# Patient Record
Sex: MALE | Race: WHITE | NOT HISPANIC OR LATINO | Employment: OTHER | URBAN - NONMETROPOLITAN AREA
[De-identification: names, ages, dates, MRNs, and addresses within clinical notes are randomized per-mention and may not be internally consistent; named-entity substitution may affect disease eponyms.]

---

## 2017-06-14 ENCOUNTER — OFFICE VISIT - GICH (OUTPATIENT)
Dept: FAMILY MEDICINE | Facility: OTHER | Age: 72
End: 2017-06-14

## 2017-06-14 ENCOUNTER — HISTORY (OUTPATIENT)
Dept: FAMILY MEDICINE | Facility: OTHER | Age: 72
End: 2017-06-14
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
## 2017-06-14 DIAGNOSIS — S30.861A INSECT BITE (NONVENOMOUS) OF ABDOMINAL WALL, INITIAL ENCOUNTER (CODE): ICD-10-CM

## 2017-08-02 ENCOUNTER — HISTORY (OUTPATIENT)
Dept: FAMILY MEDICINE | Facility: OTHER | Age: 72
End: 2017-08-02

## 2017-08-02 ENCOUNTER — OFFICE VISIT - GICH (OUTPATIENT)
Dept: FAMILY MEDICINE | Facility: OTHER | Age: 72
End: 2017-08-02

## 2017-08-02 DIAGNOSIS — N42.9 DISORDER OF PROSTATE: ICD-10-CM

## 2017-08-02 DIAGNOSIS — I10 ESSENTIAL (PRIMARY) HYPERTENSION: ICD-10-CM

## 2017-08-02 DIAGNOSIS — I25.10 ATHEROSCLEROTIC HEART DISEASE OF NATIVE CORONARY ARTERY WITHOUT ANGINA PECTORIS: ICD-10-CM

## 2017-08-02 DIAGNOSIS — E78.5 HYPERLIPIDEMIA: ICD-10-CM

## 2017-08-02 LAB
A/G RATIO - HISTORICAL: 1.4 (ref 1–2)
ABSOLUTE BASOPHILS - HISTORICAL: 0.1 THOU/CU MM
ABSOLUTE EOSINOPHILS - HISTORICAL: 0.7 THOU/CU MM
ABSOLUTE IMMATURE GRANULOCYTES(METAS,MYELOS,PROS) - HISTORICAL: 0 THOU/CU MM
ABSOLUTE LYMPHOCYTES - HISTORICAL: 3.5 THOU/CU MM (ref 0.9–2.9)
ABSOLUTE MONOCYTES - HISTORICAL: 0.8 THOU/CU MM
ABSOLUTE NEUTROPHILS - HISTORICAL: 4.8 THOU/CU MM (ref 1.7–7)
ALBUMIN SERPL-MCNC: 4.3 G/DL (ref 3.5–5.7)
ALP SERPL-CCNC: 67 IU/L (ref 34–104)
ALT (SGPT) - HISTORICAL: 19 IU/L (ref 7–52)
ANION GAP - HISTORICAL: 8 (ref 5–18)
AST SERPL-CCNC: 20 IU/L (ref 13–39)
BASOPHILS # BLD AUTO: 0.7 %
BILIRUB SERPL-MCNC: 0.4 MG/DL (ref 0.3–1)
BUN SERPL-MCNC: 21 MG/DL (ref 7–25)
BUN/CREAT RATIO - HISTORICAL: 26
CALCIUM SERPL-MCNC: 9.7 MG/DL (ref 8.6–10.3)
CHLORIDE SERPLBLD-SCNC: 105 MMOL/L (ref 98–107)
CHOL/HDL RATIO - HISTORICAL: 3.24
CHOLESTEROL TOTAL: 191 MG/DL
CO2 SERPL-SCNC: 26 MMOL/L (ref 21–31)
CREAT SERPL-MCNC: 0.81 MG/DL (ref 0.7–1.3)
EOSINOPHIL NFR BLD AUTO: 6.6 %
ERYTHROCYTE [DISTWIDTH] IN BLOOD BY AUTOMATED COUNT: 13.8 % (ref 11.5–15.5)
GFR IF NOT AFRICAN AMERICAN - HISTORICAL: >60 ML/MIN/1.73M2
GLOBULIN - HISTORICAL: 3.1 G/DL (ref 2–3.7)
GLUCOSE SERPL-MCNC: 99 MG/DL (ref 70–105)
HCT VFR BLD AUTO: 47.3 % (ref 37–53)
HDLC SERPL-MCNC: 59 MG/DL (ref 23–92)
HEMOGLOBIN: 16 G/DL (ref 13.5–17.5)
IMMATURE GRANULOCYTES(METAS,MYELOS,PROS) - HISTORICAL: 0.4 %
LDLC SERPL CALC-MCNC: 84 MG/DL
LYMPHOCYTES NFR BLD AUTO: 35.1 % (ref 20–44)
MCH RBC QN AUTO: 31.1 PG (ref 26–34)
MCHC RBC AUTO-ENTMCNC: 33.8 G/DL (ref 32–36)
MCV RBC AUTO: 92 FL (ref 80–100)
MONOCYTES NFR BLD AUTO: 8.4 %
NEUTROPHILS NFR BLD AUTO: 48.8 % (ref 42–72)
NON-HDL CHOLESTEROL - HISTORICAL: 132 MG/DL
PATIENT STATUS - HISTORICAL: ABNORMAL
PLATELET # BLD AUTO: 294 THOU/CU MM (ref 140–440)
PMV BLD: 9.4 FL (ref 6.5–11)
POTASSIUM SERPL-SCNC: 4.1 MMOL/L (ref 3.5–5.1)
PROT SERPL-MCNC: 7.4 G/DL (ref 6.4–8.9)
PSA TOTAL (DIAGNOSTIC) - HISTORICAL: 1.78 NG/ML
RED BLOOD COUNT - HISTORICAL: 5.14 MIL/CU MM (ref 4.3–5.9)
SODIUM SERPL-SCNC: 139 MMOL/L (ref 133–143)
TRIGL SERPL-MCNC: 240 MG/DL
WHITE BLOOD COUNT - HISTORICAL: 9.9 THOU/CU MM (ref 4.5–11)

## 2017-08-03 ENCOUNTER — AMBULATORY - GICH (OUTPATIENT)
Dept: FAMILY MEDICINE | Facility: OTHER | Age: 72
End: 2017-08-03

## 2017-10-11 ENCOUNTER — AMBULATORY - GICH (OUTPATIENT)
Dept: FAMILY MEDICINE | Facility: OTHER | Age: 72
End: 2017-10-11

## 2017-10-11 DIAGNOSIS — Z23 ENCOUNTER FOR IMMUNIZATION: ICD-10-CM

## 2017-12-28 NOTE — PROGRESS NOTES
Patient Information     Patient Name MRN Sex Doroteo Lang [id number] Male 1945      Progress Notes by Yong Diaz MD at 2017  3:45 PM     Author:  Yong Diaz MD Service:  (none) Author Type:  Physician     Filed:  2017  5:53 PM Encounter Date:  2017 Status:  Signed     :  Yong Diaz MD (Physician)            There are no exam notes on file for this visit.    SUBJECTIVE:  Doroteo Martinez  is a 72 y.o. male who comes in today for complete evaluation. I saw him about a year ago prior to cataract surgery.    He continues on generic atorvastatin. He is doing well and has not had prednisone in 2 years. He is up-to-date on health maintenance issues. He has not had Prevnar. He declines that shot today.     He has built a shed, a deck, another shed, and a path with stairs to garden.  They got back in mid May.  His son and family moved back from [place] to [place] and Geoff did a lot of work in their house.    His wife has joint pain that has been getting worse.         Past Medical, Family, and Social History reviewed and updated as noted below.   ROS is negative except as noted above       Allergies      Allergen   Reactions     Aspirin  Other - Describe In Comment Field     colitis    ,   Family History       Problem   Relation Age of Onset     Other  Mother      Dementia       Other  Father      COPD     ,   Current Outpatient Prescriptions on File Prior to Visit       Medication  Sig Dispense Refill     Blood Pressure Test Kit-Large kit As directed. 1 Kit 0     No current facility-administered medications on file prior to visit.    ,   Past Medical History:     Diagnosis  Date     Hyperlipidemia      Kidney stones 1970s     MI (myocardial infarction) (HC)     Stents      Ulcerative colitis (HC)    ,   Patient Active Problem List      Diagnosis Date Noted     ACP (advance care planning) 12/10/2013     Arthritis of right wrist 2013     ABDOMINAL WALL  "HERNIA 10/13/2011     COLITIS, ULCERATIVE      OTHER AND UNSPECIFIED DISEASES OF APPENDIX      HYPERTENSION      CORONARY ARTERY DISEASE      DIVERTICULOSIS, COLON      HYPERLIPIDEMIA 07/13/2011     FISTULA, INTESTINOVESICAL 04/26/2011   ,   Past Surgical History:      Procedure  Laterality Date     COLECTOMY  4/2011    colon resection with removal of 18 inches of colon and repair of colovesicular fistula Dr. Dawson       COLONOSCOPY SCREENING  6/12    with biopsy, Dr. Barr, [facility]       COLONOSCOPY SCREENING  4/2011     CORONARY STENT PLACEMENT  1997    MI with stenting       SCAN-ANGIOGRAM  1997    coronary angiography with stenting [facility]      and   Social History        Substance Use Topics          Smoking status:   Former Smoker      Quit date:  9/5/1976      Smokeless tobacco:   Never Used      Alcohol use   0.0 oz/week     0 Standard drinks or equivalent per week        Comment: daily wine with dinner 3-4 at times       OBJECTIVE:  /86  Pulse 80  Ht 1.619 m (5' 3.75\")  Wt 63 kg (138 lb 12.8 oz)  BMI 24.01 kg/m2   EXAM:  General Appearance: Pleasant, alert, appropriate appearance for age. No acute distress  Head Exam: Normal. Normocephalic, atraumatic.  Eye Exam:  Normal external eye, conjunctiva, lids, cornea. SHANDRA. EOMI  Ear Exam: Normal TM's bilaterally. Normal auditory canals and external ears. Non-tender.  Nose Exam: Normal external nose, mucus membranes, and septum.  OroPharynx Exam:  Dental hygiene adequate. Normal buccal mucosa. Normal pharynx.  Neck Exam:  Supple, no masses or nodes. No audible bruits  Thyroid Exam: No nodules or enlargement.  Chest/Respiratory Exam: Normal chest wall and respirations. Clear to auscultation.  Cardiovascular Exam: Regular rate and rhythm. S1, S2, no murmur, click, gallop, or rubs.  Gastrointestinal Exam: Soft, non-tender, no masses or organomegaly.  Lymphatic Exam: Non-palpable nodes in neck, clavicular regions.  Musculoskeletal Exam: Back is straight " and non-tender, full ROM of upper and lower extremities.  Foot Exam: Left and right foot: good pedal pulses  Skin: no rash or abnormalities  Neurologic Exam: Nonfocal, normal gross motor, tone coordination and no tremor.  Psychiatric Exam: Alert and oriented - appropriate affect.     Results for orders placed or performed in visit on 08/02/17      COMP METABOLIC PANEL      Result  Value Ref Range    SODIUM 139 133 - 143 mmol/L    POTASSIUM 4.1 3.5 - 5.1 mmol/L    CHLORIDE 105 98 - 107 mmol/L    CO2,TOTAL 26 21 - 31 mmol/L    ANION GAP 8 5 - 18                    GLUCOSE 99 70 - 105 mg/dL    CALCIUM 9.7 8.6 - 10.3 mg/dL    BUN 21 7 - 25 mg/dL    CREATININE 0.81 0.70 - 1.30 mg/dL    BUN/CREAT RATIO           26                    GFR if African American >60 >60 ml/min/1.73m2    GFR if not African American >60 >60 ml/min/1.73m2    ALBUMIN 4.3 3.5 - 5.7 g/dL    PROTEIN,TOTAL 7.4 6.4 - 8.9 g/dL    GLOBULIN                  3.1 2.0 - 3.7 g/dL    A/G RATIO 1.4 1.0 - 2.0                    BILIRUBIN,TOTAL 0.4 0.3 - 1.0 mg/dL    ALK PHOSPHATASE 67 34 - 104 IU/L    ALT (SGPT) 19 7 - 52 IU/L    AST (SGOT) 20 13 - 39 IU/L   LIPID PANEL      Result  Value Ref Range    CHOLESTEROL,TOTAL 191 <200 mg/dL    TRIGLYCERIDES 240 (H) <150 mg/dL    HDL CHOLESTEROL 59 23 - 92 mg/dL    NON-HDL CHOLESTEROL 132 <145 mg/dl    CHOL/HDL RATIO            3.24 <4.50                    LDL CHOLESTEROL 84 <100 mg/dL    PATIENT STATUS            FASTING                   PSA TOTAL (DIAGNOSTIC)      Result  Value Ref Range    PSA TOTAL (DIAGNOSTIC) 1.781 <=3.100 ng/mL   CBC WITH AUTO DIFFERENTIAL      Result  Value Ref Range    WHITE BLOOD COUNT         9.9 4.5 - 11.0 thou/cu mm    RED BLOOD COUNT           5.14 4.30 - 5.90 mil/cu mm    HEMOGLOBIN                16.0 13.5 - 17.5 g/dL    HEMATOCRIT                47.3 37.0 - 53.0 %    MCV                       92 80 - 100 fL    MCH                       31.1 26.0 - 34.0 pg    MCHC                       33.8 32.0 - 36.0 g/dL    RDW                       13.8 11.5 - 15.5 %    PLATELET COUNT            294 140 - 440 thou/cu mm    MPV                       9.4 6.5 - 11.0 fL    NEUTROPHILS               48.8 42.0 - 72.0 %    LYMPHOCYTES               35.1 20.0 - 44.0 %    MONOCYTES                 8.4 <12.0 %    EOSINOPHILS               6.6 <8.0 %    BASOPHILS                 0.7 <3.0 %    IMMATURE GRANULOCYTES(METAS,MYELOS,PROS) 0.4 %    ABSOLUTE NEUTROPHILS      4.8 1.7 - 7.0 thou/cu mm    ABSOLUTE LYMPHOCYTES      3.5 (H) 0.9 - 2.9 thou/cu mm    ABSOLUTE MONOCYTES        0.8 <0.9 thou/cu mm    ABSOLUTE EOSINOPHILS      0.7 (H) <0.5 thou/cu mm    ABSOLUTE BASOPHILS        0.1 <0.3 thou/cu mm    ABSOLUTE IMMATURE GRANULOCYTES(METAS,MYELOS,PROS) 0.0 <=0.3 thou/cu mm      ASSESSMENT/Plan :      Doroteo was seen today for medication management.    Diagnoses and all orders for this visit:    HYPERTENSION  -     CBC AND DIFFERENTIAL; Future  -     COMP METABOLIC PANEL; Future  -     CBC AND DIFFERENTIAL  -     COMP METABOLIC PANEL  -     CBC WITH AUTO DIFFERENTIAL    Hyperlipidemia, unspecified hyperlipidemia type  -     COMP METABOLIC PANEL; Future  -     LIPID PANEL; Future  -     atorvastatin (LIPITOR) 40 mg tablet; Take 1 tablet by mouth once daily.  -     COMP METABOLIC PANEL  -     LIPID PANEL    Atherosclerosis of coronary artery of native heart without angina pectoris, unspecified vessel or lesion type  -     CBC AND DIFFERENTIAL; Future  -     CBC AND DIFFERENTIAL  -     CBC WITH AUTO DIFFERENTIAL    Prostate disorder  -     PSA TOTAL (DIAGNOSTIC); Future  -     PSA TOTAL (DIAGNOSTIC)      Will notify of lab results when available. Discussed diet, exercise and healthy lifestyle changes. Continue current medications. Offered Prevnar and he declines.     A total of 25 minutes was spent with the patient, greater than 50% of the time was spent in counseling/discussion of the aforementioned concerns.       Yong VAZQUEZ  MD Joe

## 2017-12-28 NOTE — PROGRESS NOTES
Patient Information     Patient Name MRN Sex Doroteo Chow [id number] Male 1945      Progress Notes by Chetna Espinoza NP at 2017 12:15 PM     Author:  Chetna Espinoza NP Service:  (none) Author Type:  PHYS- Nurse Practitioner     Filed:  2017  2:58 PM Encounter Date:  2017 Status:  Signed     :  Chetna Espinoza NP (PHYS- Nurse Practitioner)            HPI:    Doroteo Martinez is a 72 y.o. male who presents to clinic today for tick bite.    Found an attached tick on his back yesterday.   He thinks it was a deer tick but he is unsure.  States tick was not engorged or swollen.  Unsure how long the tick was attached.  Area does not itch and is not painful.  Washed area well and covering with bandaid.   Denies any fevers or general illness.              Past Medical History:     Diagnosis  Date     Hyperlipidemia      Kidney stones      MI (myocardial infarction) (HC)     Stents      Ulcerative colitis (HC)      Past Surgical History:      Procedure  Laterality Date     COLECTOMY  2011    colon resection with removal of 18 inches of colon and repair of colovesicular fistula Dr. Dawson       [facility]       [facility]ENING  2011     CORONARY STENT PLACEMENT      MI with stenting       SCAN-ANGIOGRAM      coronary angiography with stenting [facility]       Social History        Substance Use Topics          Smoking status:   Former Smoker      Quit date:  1976      Smokeless tobacco:   Never Used      Alcohol use   0.0 oz/week     0 Standard drinks or equivalent per week        Comment: daily wine with dinner 3-4 at times       Current Outpatient Prescriptions       Medication  Sig Dispense Refill     atorvastatin (LIPITOR) 40 mg tablet TAKE ONE TABLET BY MOUTH ONE TIME DAILY 90 tablet 3     Blood Pressure Test Kit-Large kit As directed. 1 Kit 0     No current facility-administered  "medications for this visit.      Medications have been reviewed by me and are current to the best of my knowledge and ability.    Allergies      Allergen   Reactions     Aspirin  Other - Describe In Comment Field     colitis        Past medical history, past surgical history, current medications and allergies reviewed and accurate to the best of my knowledge.        ROS:  Refer to HPI    /72  Pulse 68  Temp 97  F (36.1  C) (Tympanic)   Resp 20  Ht 1.651 m (5' 5\")  Wt 65 kg (143 lb 3.2 oz)  BMI 23.83 kg/m2    EXAM:  General Appearance: Well appearing elderly male, appropriate appearance for age. No acute distress  Respiratory:   Normal effort.    Musculoskeletal:  Normal gait.  Equal movement of bilateral upper extremities.  Equal movement of bilateral lower extremities.    Dermatological: Right flank/mid lateral back with single tick bite - no scab, no erythema or excoriation, no swelling, no bruising  Psychological: normal affect, alert and pleasant          ASSESSMENT/PLAN:    ICD-10-CM    1. Tick bite of right flank, initial encounter S30.861A      W57.XXXA          Discussed at length tick education and treatment options  Patient was given the option of prophylactic treatment vs watch & wait  Patient states after tick education he chooses the watch & wait approach  No lab work needed today as it is too soon for accurate results   Wash area daily and apply antibiotic ointment as needed   Discussed warning signs/symptoms   Follow up if symptoms occur (increased redness, fevers, aches, chills, etc) or any concerns         Patient Instructions      Index Romanian Related topics   Tick Bite   ________________________________________________________________________  KEY POINTS    Ticks are small bugs that feed on the blood of animals, birds, and people. If you find a tick attached to your skin, you have been bitten. You usually will not feel anything when a tick bites you, but you may have a little redness " around the bite.    You need to remove the tick yourself or get help from your healthcare provider. Save the tick in case you later start having symptoms of disease and need to know what kind of tick bit you. Check for a rash and other symptoms for about 4 weeks after the bite.    When you are outdoors, wear long-sleeved shirts tucked into your pants. Wear your pants tucked into your socks or boot tops if possible. Use approved tick repellents on exposed skin and clothing.  ________________________________________________________________________  What are ticks?  Ticks are small bugs that feed on the blood of animals, birds, and people. There are many different kinds of ticks. Black-legged ticks, or deer ticks, are usually no bigger than the head of a pin. Wood and dog ticks are usually much larger. They may be about a quarter of an inch before feeding and half an inch when they are full of blood.  Ticks are found in woodlands, grasslands, and marshlands and at the seashore. Wild birds and animals, as well as domestic animals and pets such as dogs, horses, and cows, can carry ticks. Ticks may climb on humans from animals, leaves, or low-lying brush. They may fall from tree leaves, especially on a windy day. Ticks cannot jump or fly.  How do I know if I have been bitten by a tick?  If you find a tick attached to your skin, you have been bitten. You usually will not feel anything when a tick bites you, but you may have a little redness around the bite.  Can I get sick from a tick bite?  There is little risk from the bite of a tick most of the time. However, some ticks carry infections that can be passed to people. For example:    Deer tick bites may cause Lyme disease. The symptoms of Lyme disease include a red, round rash that starts at the site of the bite and gets bigger. Over time, a similar rash may appear in other parts of the body. In some cases, the rash looks like a bulls-eye or target on your skin. Some  people have a rash without other symptoms. If you do have other symptoms, they may include feeling very tired, headache, muscle aches, joint pain or swelling, and a fever.    Wood tick or dog tick bites may cause [place] spotted fever (RMSF). RMSF may first cause fever, headache, muscle aches, joint pain or swelling, and then a pink or red spotted rash. You need to get treatment right away if you have these symptoms and have had a tick bite. RMSF is seen in most states, not just the [place] areas.  Tick bites may cause other diseases as well.  How are tick bites treated?  If you find a tick attached to your body, you need to remove it. You can remove it yourself or get help from your healthcare provider. To remove an attached tick:    Grasp the tick with tweezers or fingers (covered with gloves or a tissue) as close to the skin as possible.    Gently pull the tick straight away from you until it releases its hold. Use a slow gentle pulling motion. Pulling the tick out too quickly may tear the body from the mouth, leaving the mouth still in the skin. If you are unable to remove the tick completely, you may need to see your healthcare provider.    Do not twist the tick as you pull, and try not to squeeze its body. Squeezing or crushing the tick could force infected fluids from the tick into the site of the bite.  After you have removed the tick, thoroughly wash your hands and the bite area with soap and water. Put an antiseptic such as rubbing alcohol on the area where you were bitten.  Infected ticks usually do not spread an infection until after the tick has been attached and feeding on your blood for several hours. Check for a rash and other symptoms for about 4 weeks after the bite.  Save the tick in case you later start having symptoms of disease and need to know what kind of tick bit you. Put the tick in a sealed plastic bag and keep it in the freezer. Identification of the tick may help your  provider diagnose and treat your symptoms. If you do not have any symptoms of disease after 1 month, you can throw the tick away.  How can I help prevent tick bites?    In areas of thick underbrush, try to stay near the center of trails.    When you are outdoors, wear long-sleeved shirts tucked into your pants. Wear your pants tucked into your socks or boot tops if possible. A hat may help, too. Wearing light-colored clothing may make it easier to spot a small tick before it reaches your skin and bites. While you are outside, check for ticks every 4 hours and remove any ticks on clothing or exposed skin.    Use approved tick repellents on exposed skin and clothing. Don't use more repellent than recommended in the package directions. Don't put repellent on open wounds or rashes. When using sprays, don t spray the repellent directly on your face. Spray the repellent on your hands first and then put it on your face, but not near your eyes or mouth. Then wash the spray off your hands.    Adults should use products with no more than 35% DEET. Children older than 2 months can use repellents with no more than 30% DEET. DEET should be applied just once a day. Wash it off your body when you go back indoors. Some products contain more than 35% DEET. The higher concentrations are no more effective than the lower concentrations, but they may last longer. Read the label carefully before applying.    Picaridin may irritate the skin less than DEET and appears to be just as effective.    Spray clothes with repellents because insects may crawl from clothing to the skin or bite through thin clothing. Products containing permethrin are recommended for use on clothing, shoes, bed nets, and camping gear. Permethrin-treated clothing repels and kills ticks, mosquitoes, and other insects and can keep working after laundering. Permethrin should be reapplied to clothing according to the instructions on the product label. You can buy clothing  and hats pretreated with permethrin. Permethrin does not work as a repellent when it is put on the skin.    Treat household pets for ticks and fleas. Check pets after they have been outdoors.    Brush off clothing and pets before entering the house.    After you have been outdoors, undress and check your body for ticks. They usually crawl around for several hours before biting. Check your clothes, too. Wash them right away to remove any ticks.    Shower and shampoo after your outing.    Inspect any gear you have carried outdoors.    If you spend much time hiking, you may want to include a pair of tick tweezers in your first-aid kit. You can buy them at sporting goods stores.  Developed by [employer].  Adult Advisor 2016.3 published by [employer].  Last modified: 2016-04-04  Last reviewed: 2016-03-31  This content is reviewed periodically and is subject to change as new health information becomes available. The information is intended to inform and educate and is not a replacement for medical evaluation, advice, diagnosis or treatment by a healthcare professional.  References   Adult Advisor 2016.3 Index    Copyright   2016 [employer], a division of [employer] All rights reserved.

## 2017-12-29 NOTE — PATIENT INSTRUCTIONS
Patient Information     Patient Name MRN Doroteo Shankar [id number] Male 1945      Patient Instructions by Chetna Espinoza NP at 2017 12:56 PM     Author:  Chetna Espinoza NP  Service:  (none) Author Type:  PHYS- Nurse Practitioner     Filed:  2017 12:57 PM  Encounter Date:  2017 Status:  Addendum     :  Chetna Espinoza NP (PHYS- Nurse Practitioner)        Related Notes: Original Note by Chetna Espinoza NP (PHYS- Nurse Practitioner) filed at 2017 12:56 PM               Index Japanese Related topics   Tick Bite   ________________________________________________________________________  KEY POINTS    Ticks are small bugs that feed on the blood of animals, birds, and people. If you find a tick attached to your skin, you have been bitten. You usually will not feel anything when a tick bites you, but you may have a little redness around the bite.    You need to remove the tick yourself or get help from your healthcare provider. Save the tick in case you later start having symptoms of disease and need to know what kind of tick bit you. Check for a rash and other symptoms for about 4 weeks after the bite.    When you are outdoors, wear long-sleeved shirts tucked into your pants. Wear your pants tucked into your socks or boot tops if possible. Use approved tick repellents on exposed skin and clothing.  ________________________________________________________________________  What are ticks?  Ticks are small bugs that feed on the blood of animals, birds, and people. There are many different kinds of ticks. Black-legged ticks, or deer ticks, are usually no bigger than the head of a pin. Wood and dog ticks are usually much larger. They may be about a quarter of an inch before feeding and half an inch when they are full of blood.  Ticks are found in woodlands, grasslands, and marshlands and at the seashore. Wild birds and animals, as well as domestic animals and pets such  as dogs, horses, and cows, can carry ticks. Ticks may climb on humans from animals, leaves, or low-lying brush. They may fall from tree leaves, especially on a windy day. Ticks cannot jump or fly.  How do I know if I have been bitten by a tick?  If you find a tick attached to your skin, you have been bitten. You usually will not feel anything when a tick bites you, but you may have a little redness around the bite.  Can I get sick from a tick bite?  There is little risk from the bite of a tick most of the time. However, some ticks carry infections that can be passed to people. For example:    Deer tick bites may cause Lyme disease. The symptoms of Lyme disease include a red, round rash that starts at the site of the bite and gets bigger. Over time, a similar rash may appear in other parts of the body. In some cases, the rash looks like a bulls-eye or target on your skin. Some people have a rash without other symptoms. If you do have other symptoms, they may include feeling very tired, headache, muscle aches, joint pain or swelling, and a fever.    Wood tick or dog tick bites may cause [place] spotted fever (RMSF). RMSF may first cause fever, headache, muscle aches, joint pain or swelling, and then a pink or red spotted rash. You need to get treatment right away if you have these symptoms and have had a tick bite. RMSF is seen in most states, not just the [place] areas.  Tick bites may cause other diseases as well.  How are tick bites treated?  If you find a tick attached to your body, you need to remove it. You can remove it yourself or get help from your healthcare provider. To remove an attached tick:    Grasp the tick with tweezers or fingers (covered with gloves or a tissue) as close to the skin as possible.    Gently pull the tick straight away from you until it releases its hold. Use a slow gentle pulling motion. Pulling the tick out too quickly may tear the body from the mouth, leaving the mouth  still in the skin. If you are unable to remove the tick completely, you may need to see your healthcare provider.    Do not twist the tick as you pull, and try not to squeeze its body. Squeezing or crushing the tick could force infected fluids from the tick into the site of the bite.  After you have removed the tick, thoroughly wash your hands and the bite area with soap and water. Put an antiseptic such as rubbing alcohol on the area where you were bitten.  Infected ticks usually do not spread an infection until after the tick has been attached and feeding on your blood for several hours. Check for a rash and other symptoms for about 4 weeks after the bite.  Save the tick in case you later start having symptoms of disease and need to know what kind of tick bit you. Put the tick in a sealed plastic bag and keep it in the freezer. Identification of the tick may help your provider diagnose and treat your symptoms. If you do not have any symptoms of disease after 1 month, you can throw the tick away.  How can I help prevent tick bites?    In areas of thick underbrush, try to stay near the center of trails.    When you are outdoors, wear long-sleeved shirts tucked into your pants. Wear your pants tucked into your socks or boot tops if possible. A hat may help, too. Wearing light-colored clothing may make it easier to spot a small tick before it reaches your skin and bites. While you are outside, check for ticks every 4 hours and remove any ticks on clothing or exposed skin.    Use approved tick repellents on exposed skin and clothing. Don't use more repellent than recommended in the package directions. Don't put repellent on open wounds or rashes. When using sprays, don t spray the repellent directly on your face. Spray the repellent on your hands first and then put it on your face, but not near your eyes or mouth. Then wash the spray off your hands.    Adults should use products with no more than 35% DEET. Children  older than 2 months can use repellents with no more than 30% DEET. DEET should be applied just once a day. Wash it off your body when you go back indoors. Some products contain more than 35% DEET. The higher concentrations are no more effective than the lower concentrations, but they may last longer. Read the label carefully before applying.    Picaridin may irritate the skin less than DEET and appears to be just as effective.    Spray clothes with repellents because insects may crawl from clothing to the skin or bite through thin clothing. Products containing permethrin are recommended for use on clothing, shoes, bed nets, and camping gear. Permethrin-treated clothing repels and kills ticks, mosquitoes, and other insects and can keep working after laundering. Permethrin should be reapplied to clothing according to the instructions on the product label. You can buy clothing and hats pretreated with permethrin. Permethrin does not work as a repellent when it is put on the skin.    Treat household pets for ticks and fleas. Check pets after they have been outdoors.    Brush off clothing and pets before entering the house.    After you have been outdoors, undress and check your body for ticks. They usually crawl around for several hours before biting. Check your clothes, too. Wash them right away to remove any ticks.    Shower and shampoo after your outing.    Inspect any gear you have carried outdoors.    If you spend much time hiking, you may want to include a pair of tick tweezers in your first-aid kit. You can buy them at sporting goods stores.  Developed by [employer].  Adult Advisor 2016.3 published by [employer].  Last modified: 2016-04-04  Last reviewed: 2016-03-31  This content is reviewed periodically and is subject to change as new health information becomes available. The information is intended to inform and educate and is not a replacement for medical evaluation, advice, diagnosis or treatment by a  healthcare professional.  References   Adult Advisor 2016.3 Index    Copyright   2016 [employer], a division of [employer] All rights reserved.

## 2017-12-30 NOTE — NURSING NOTE
Patient Information     Patient Name MRN Sex Doroteo Lang [id number] Male 1945      Nursing Note by Meg Guevara at 2017 12:15 PM     Author:  Meg Guevara Service:  (none) Author Type:  (none)     Filed:  2017 12:46 PM Encounter Date:  2017 Status:  Signed     :  Meg Guevara            Patient presents to clinic with tick bite on back. He states that the tick was dead before wife removed it.  Meg GuevaraLPN ....................  2017   12:12 PM

## 2018-01-26 VITALS
BODY MASS INDEX: 23.7 KG/M2 | SYSTOLIC BLOOD PRESSURE: 146 MMHG | DIASTOLIC BLOOD PRESSURE: 86 MMHG | HEART RATE: 80 BPM | HEIGHT: 64 IN | WEIGHT: 138.8 LBS

## 2018-01-26 VITALS
SYSTOLIC BLOOD PRESSURE: 128 MMHG | HEART RATE: 68 BPM | RESPIRATION RATE: 20 BRPM | WEIGHT: 143.2 LBS | HEIGHT: 65 IN | TEMPERATURE: 97 F | BODY MASS INDEX: 23.86 KG/M2 | DIASTOLIC BLOOD PRESSURE: 72 MMHG

## 2018-03-08 ENCOUNTER — DOCUMENTATION ONLY (OUTPATIENT)
Dept: FAMILY MEDICINE | Facility: OTHER | Age: 73
End: 2018-03-08

## 2018-03-08 PROBLEM — I25.10 CORONARY ATHEROSCLEROSIS: Status: ACTIVE | Noted: 2018-03-08

## 2018-03-08 PROBLEM — K38.9 OTHER AND UNSPECIFIED DISEASES OF APPENDIX: Status: ACTIVE | Noted: 2018-03-08

## 2018-03-08 PROBLEM — I10 HYPERTENSION: Status: ACTIVE | Noted: 2018-03-08

## 2018-03-08 PROBLEM — K57.30 DIVERTICULAR DISEASE OF COLON: Status: ACTIVE | Noted: 2018-03-08

## 2018-03-08 PROBLEM — K51.90 COLITIS, ULCERATIVE (H): Status: ACTIVE | Noted: 2018-03-08

## 2018-03-08 RX ORDER — ATORVASTATIN CALCIUM 40 MG/1
40 TABLET, FILM COATED ORAL DAILY
COMMUNITY
Start: 2017-08-02 | End: 2018-09-13

## 2018-03-08 RX ORDER — BENZOCAINE/MENTHOL 6 MG-10 MG
LOZENGE MUCOUS MEMBRANE
COMMUNITY
Start: 2016-05-10

## 2018-09-13 ENCOUNTER — OFFICE VISIT (OUTPATIENT)
Dept: FAMILY MEDICINE | Facility: OTHER | Age: 73
End: 2018-09-13
Attending: FAMILY MEDICINE
Payer: COMMERCIAL

## 2018-09-13 VITALS
DIASTOLIC BLOOD PRESSURE: 94 MMHG | SYSTOLIC BLOOD PRESSURE: 146 MMHG | TEMPERATURE: 97.2 F | BODY MASS INDEX: 23.13 KG/M2 | HEART RATE: 72 BPM | WEIGHT: 138.8 LBS | HEIGHT: 65 IN

## 2018-09-13 DIAGNOSIS — I10 ESSENTIAL HYPERTENSION: ICD-10-CM

## 2018-09-13 DIAGNOSIS — K51.90 ULCERATIVE COLITIS WITHOUT COMPLICATIONS, UNSPECIFIED LOCATION (H): ICD-10-CM

## 2018-09-13 DIAGNOSIS — M19.031 PRIMARY OSTEOARTHRITIS OF BOTH WRISTS: ICD-10-CM

## 2018-09-13 DIAGNOSIS — I25.10 ATHEROSCLEROSIS OF CORONARY ARTERY OF NATIVE HEART WITHOUT ANGINA PECTORIS, UNSPECIFIED VESSEL OR LESION TYPE: ICD-10-CM

## 2018-09-13 DIAGNOSIS — M19.032 PRIMARY OSTEOARTHRITIS OF BOTH WRISTS: ICD-10-CM

## 2018-09-13 DIAGNOSIS — E78.5 HYPERLIPIDEMIA, UNSPECIFIED HYPERLIPIDEMIA TYPE: Primary | ICD-10-CM

## 2018-09-13 DIAGNOSIS — N42.9 PROSTATE DISORDER: ICD-10-CM

## 2018-09-13 LAB
ALBUMIN SERPL-MCNC: 4.4 G/DL (ref 3.5–5.7)
ALP SERPL-CCNC: 63 U/L (ref 34–104)
ALT SERPL W P-5'-P-CCNC: 16 U/L (ref 7–52)
ANION GAP SERPL CALCULATED.3IONS-SCNC: 7 MMOL/L (ref 3–14)
AST SERPL W P-5'-P-CCNC: 20 U/L (ref 13–39)
BASOPHILS # BLD AUTO: 0.1 10E9/L (ref 0–0.2)
BASOPHILS NFR BLD AUTO: 0.8 %
BILIRUB SERPL-MCNC: 0.8 MG/DL (ref 0.3–1)
BUN SERPL-MCNC: 22 MG/DL (ref 7–25)
CALCIUM SERPL-MCNC: 9.5 MG/DL (ref 8.6–10.3)
CHLORIDE SERPL-SCNC: 102 MMOL/L (ref 98–107)
CHOLEST SERPL-MCNC: 193 MG/DL
CO2 SERPL-SCNC: 28 MMOL/L (ref 21–31)
CREAT SERPL-MCNC: 0.87 MG/DL (ref 0.7–1.3)
DIFFERENTIAL METHOD BLD: NORMAL
EOSINOPHIL # BLD AUTO: 0.3 10E9/L (ref 0–0.7)
EOSINOPHIL NFR BLD AUTO: 3.4 %
ERYTHROCYTE [DISTWIDTH] IN BLOOD BY AUTOMATED COUNT: 13.7 % (ref 10–15)
GFR SERPL CREATININE-BSD FRML MDRD: 86 ML/MIN/1.7M2
GLUCOSE SERPL-MCNC: 99 MG/DL (ref 70–105)
HCT VFR BLD AUTO: 46.4 % (ref 40–53)
HDLC SERPL-MCNC: 62 MG/DL (ref 23–92)
HGB BLD-MCNC: 15.2 G/DL (ref 13.3–17.7)
IMM GRANULOCYTES # BLD: 0 10E9/L (ref 0–0.4)
IMM GRANULOCYTES NFR BLD: 0.5 %
LDLC SERPL CALC-MCNC: 111 MG/DL
LYMPHOCYTES # BLD AUTO: 2.5 10E9/L (ref 0.8–5.3)
LYMPHOCYTES NFR BLD AUTO: 31.9 %
MCH RBC QN AUTO: 29.6 PG (ref 26.5–33)
MCHC RBC AUTO-ENTMCNC: 32.8 G/DL (ref 31.5–36.5)
MCV RBC AUTO: 90 FL (ref 78–100)
MONOCYTES # BLD AUTO: 0.6 10E9/L (ref 0–1.3)
MONOCYTES NFR BLD AUTO: 7.7 %
NEUTROPHILS # BLD AUTO: 4.4 10E9/L (ref 1.6–8.3)
NEUTROPHILS NFR BLD AUTO: 55.7 %
NONHDLC SERPL-MCNC: 131 MG/DL
PLATELET # BLD AUTO: 305 10E9/L (ref 150–450)
POTASSIUM SERPL-SCNC: 4.1 MMOL/L (ref 3.5–5.1)
PROT SERPL-MCNC: 7.4 G/DL (ref 6.4–8.9)
PSA SERPL-MCNC: 2.21 NG/ML
RBC # BLD AUTO: 5.13 10E12/L (ref 4.4–5.9)
SODIUM SERPL-SCNC: 137 MMOL/L (ref 134–144)
TRIGL SERPL-MCNC: 102 MG/DL
WBC # BLD AUTO: 7.8 10E9/L (ref 4–11)

## 2018-09-13 PROCEDURE — 36415 COLL VENOUS BLD VENIPUNCTURE: CPT | Performed by: FAMILY MEDICINE

## 2018-09-13 PROCEDURE — 84153 ASSAY OF PSA TOTAL: CPT | Performed by: FAMILY MEDICINE

## 2018-09-13 PROCEDURE — 80053 COMPREHEN METABOLIC PANEL: CPT | Performed by: FAMILY MEDICINE

## 2018-09-13 PROCEDURE — 80061 LIPID PANEL: CPT | Performed by: FAMILY MEDICINE

## 2018-09-13 PROCEDURE — 85025 COMPLETE CBC W/AUTO DIFF WBC: CPT | Performed by: FAMILY MEDICINE

## 2018-09-13 PROCEDURE — G0463 HOSPITAL OUTPT CLINIC VISIT: HCPCS

## 2018-09-13 PROCEDURE — 99214 OFFICE O/P EST MOD 30 MIN: CPT | Performed by: FAMILY MEDICINE

## 2018-09-13 RX ORDER — ATORVASTATIN CALCIUM 40 MG/1
40 TABLET, FILM COATED ORAL DAILY
Qty: 90 TABLET | Refills: 4 | Status: SHIPPED | OUTPATIENT
Start: 2018-09-13 | End: 2019-07-05

## 2018-09-13 NOTE — PROGRESS NOTES
Nursing Notes:   Meg Guevara LPN  9/13/2018 10:35 AM  Signed  Patient presents to clinic for medication check.  Meg Stapleton ....................  9/13/2018   10:35 AM        SUBJECTIVE:  Doroteo Martinez  is a 73 year old male who comes in today for follow-up and medication management.  He is on Lipitor for hyperlipidemia. I last saw him a year ago.  Labs at that time were normal.  He has a history of hypertension but is currently not on blood pressure medication.  He has a history of ulcerative colitis but that has been quiet.  He follows with gastroenterology but has not been seen for a few years.    Their daughter had another baby in January in [place], so they came back to help. [place] just moved to [place] going to [place] in late October. The cabin is not winter ready.     He has some wrist pain worse with labor. His wife went to an acupuncturist and had good results. He has questions about alternative medicine approaches.     Past Medical, Family, and Social History reviewed and updated as noted below.   ROS is negative except as noted above       Allergies   Allergen Reactions     Aspirin Other (See Comments)     colitis   ,   Family History   Problem Relation Age of Onset     Other - See Comments Mother      Dementia     Other - See Comments Father      COPD   ,   Current Outpatient Prescriptions   Medication     atorvastatin (LIPITOR) 40 MG tablet     Blood Pressure Monitor MISC     diclofenac (VOLTAREN) 1 % GEL topical gel     [DISCONTINUED] atorvastatin (LIPITOR) 40 MG tablet     No current facility-administered medications for this visit.    ,   Past Medical History:   Diagnosis Date     Calculus of kidney     1970s     Hyperlipidemia     No Comments Provided     ST elevation (STEMI) myocardial infarction (H)     1997,Stents     Ulcerative colitis without complications (H)     No Comments Provided   ,   Patient Active  "Problem List    Diagnosis Date Noted     Colitis, ulcerative (H) 03/08/2018     Priority: Medium     Coronary atherosclerosis 03/08/2018     Priority: Medium     Diverticular disease of colon 03/08/2018     Priority: Medium     Hypertension 03/08/2018     Priority: Medium     Other and unspecified diseases of appendix 03/08/2018     Priority: Medium     ACP (advance care planning) 12/10/2013     Priority: Medium     Arthritis of right wrist 09/11/2013     Priority: Medium     Abdominal wall hernia 10/13/2011     Priority: Medium     Hyperlipidemia 07/13/2011     Priority: Medium     Fistula, intestinovesical 04/26/2011     Priority: Medium   ,   Past Surgical History:   Procedure Laterality Date     COLON SURGERY      4/2011,colon resection with removal of 18 inches of colon and repair of colovesicular fistula Dr. Dawson     COLONOSCOPY      6/12,with biopsy, Dr. Barr, [facility]     COLONOSCOPY      4/2011     HEART CATH, ANGIOPLASTY      1997,MI with stenting     OTHER SURGICAL HISTORY      1997,207497,SCAN-ANGIOGRAM,coronary angiography with stenting [facility]    and   Social History   Substance Use Topics     Smoking status: Former Smoker     Quit date: 9/5/1976     Smokeless tobacco: Never Used     Alcohol use 0.0 oz/week      Comment: Alcoholic Drinks/day: daily wine with dinner 3-4 at times     OBJECTIVE:  BP (!) 146/94  Pulse 72  Temp 97.2  F (36.2  C)  Ht 5' 5\" (1.651 m)  Wt 138 lb 12.8 oz (63 kg)  BMI 23.1 kg/m2   EXAM:  General Appearance: Pleasant, alert, appropriate appearance for age. No acute distress  Head Exam: Normal. Normocephalic, atraumatic.  Eye Exam:  Normal external eye, conjunctiva, lids, cornea. SHANDRA. EOMI  Ear Exam: Normal TM's bilaterally. Normal auditory canals and external ears. Non-tender.  Nose Exam: Normal external nose, mucus membranes, and septum.  OroPharynx Exam:  Dental hygiene adequate. Normal buccal mucosa. Normal pharynx.  Neck Exam:  Supple, no masses or nodes. No audible " bruits  Thyroid Exam: No nodules or enlargement.  Chest/Respiratory Exam: Normal chest wall and respirations. Clear to auscultation.  Cardiovascular Exam: Regular rate and rhythm. S1, S2, no murmur, click, gallop, or rubs.  Gastrointestinal Exam: Soft, non-tender, no masses or organomegaly.  Lymphatic Exam: Non-palpable nodes in neck, clavicular regions.  Musculoskeletal Exam: Back is straight and non-tender, full ROM of upper and lower extremities.  Osteoarthritic changes in both wrists.  Foot Exam: Left and right foot: good pedal pulses  Skin: no rash or abnormalities  Neurologic Exam: Nonfocal, normal gross motor, tone coordination and no tremor.  Psychiatric Exam: Alert and oriented - appropriate affect.     Results for orders placed or performed in visit on 09/13/18   CBC with platelets differential   Result Value Ref Range    WBC 7.8 4.0 - 11.0 10e9/L    RBC Count 5.13 4.4 - 5.9 10e12/L    Hemoglobin 15.2 13.3 - 17.7 g/dL    Hematocrit 46.4 40.0 - 53.0 %    MCV 90 78 - 100 fl    MCH 29.6 26.5 - 33.0 pg    MCHC 32.8 31.5 - 36.5 g/dL    RDW 13.7 10.0 - 15.0 %    Platelet Count 305 150 - 450 10e9/L    Diff Method Automated Method     % Neutrophils 55.7 %    % Lymphocytes 31.9 %    % Monocytes 7.7 %    % Eosinophils 3.4 %    % Basophils 0.8 %    % Immature Granulocytes 0.5 %    Absolute Neutrophil 4.4 1.6 - 8.3 10e9/L    Absolute Lymphocytes 2.5 0.8 - 5.3 10e9/L    Absolute Monocytes 0.6 0.0 - 1.3 10e9/L    Absolute Eosinophils 0.3 0.0 - 0.7 10e9/L    Absolute Basophils 0.1 0.0 - 0.2 10e9/L    Abs Immature Granulocytes 0.0 0 - 0.4 10e9/L   Comprehensive metabolic panel   Result Value Ref Range    Sodium 137 134 - 144 mmol/L    Potassium 4.1 3.5 - 5.1 mmol/L    Chloride 102 98 - 107 mmol/L    Carbon Dioxide 28 21 - 31 mmol/L    Anion Gap 7 3 - 14 mmol/L    Glucose 99 70 - 105 mg/dL    Urea Nitrogen 22 7 - 25 mg/dL    Creatinine 0.87 0.70 - 1.30 mg/dL    GFR Estimate 86 >60 mL/min/1.7m2    GFR Estimate If Black >90  >60 mL/min/1.7m2    Calcium 9.5 8.6 - 10.3 mg/dL    Bilirubin Total 0.8 0.3 - 1.0 mg/dL    Albumin 4.4 3.5 - 5.7 g/dL    Protein Total 7.4 6.4 - 8.9 g/dL    Alkaline Phosphatase 63 34 - 104 U/L    ALT 16 7 - 52 U/L    AST 20 13 - 39 U/L   Prostate Specific Antigen GH   Result Value Ref Range    Prostate Specific Antigen 2.206 <3.100 ng/mL   Lipid Profile   Result Value Ref Range    Cholesterol 193 <200 mg/dL    Triglycerides 102 <150 mg/dL    HDL Cholesterol 62 23 - 92 mg/dL    LDL Cholesterol Calculated 111 (H) <100 mg/dL    Non HDL Cholesterol 131 (H) <130 mg/dL      ASSESSMENT/Plan :    Doroteo was seen today for medication therapy management.    Diagnoses and all orders for this visit:    Hyperlipidemia, unspecified hyperlipidemia type  -     atorvastatin (LIPITOR) 40 MG tablet; Take 1 tablet (40 mg) by mouth daily  -     Lipid Profile; Future  -     Lipid Profile    Atherosclerosis of coronary artery of native heart without angina pectoris, unspecified vessel or lesion type  -     Lipid Profile; Future  -     Lipid Profile    Essential hypertension  -     Comprehensive metabolic panel; Future  -     Comprehensive metabolic panel    Prostate disorder  -     Prostate Specific Antigen GH; Future  -     Prostate Specific Antigen GH    Ulcerative colitis without complications, unspecified location (H)  -     CBC with platelets differential; Future  -     CBC with platelets differential    Primary osteoarthritis of both wrists  -     diclofenac (VOLTAREN) 1 % GEL topical gel; Apply 4 grams to knees or 2 grams to hands four times daily using enclosed dosing card.      Will notify of lab results when available. Discussed diet, exercise and healthy lifestyle changes. Continue current medications. Trial of Voltaren gel for arthritis.  Discussed Shingrix.     A total of 25 minutes was spent with the patient, greater than 50% of the time was spent in counseling/discussion of the aforementioned concerns.     Yong Diaz,  MD

## 2018-09-13 NOTE — LETTER
September 13, 2018      Doroteo Martinez  [address]  [place] [address]        Dear Geoff,     Your lab tests look fine. Your complete blood count was normal. Your comprehensive metabolic panel (a test that looks at liver and kidney function, blood sugar, electrolytes, and nutritional status) was normal. Your cholesterol is fine. Your prostate test is normal.    It was a pleasure seeing you the other day.  If you have any questions, please don't hesitate to call us.       Sincerely,        Yong Diaz MD                                        Results for orders placed or performed in visit on 09/13/18   CBC with platelets differential   Result Value Ref Range    WBC 7.8 4.0 - 11.0 10e9/L    RBC Count 5.13 4.4 - 5.9 10e12/L    Hemoglobin 15.2 13.3 - 17.7 g/dL    Hematocrit 46.4 40.0 - 53.0 %    MCV 90 78 - 100 fl    MCH 29.6 26.5 - 33.0 pg    MCHC 32.8 31.5 - 36.5 g/dL    RDW 13.7 10.0 - 15.0 %    Platelet Count 305 150 - 450 10e9/L    Diff Method Automated Method     % Neutrophils 55.7 %    % Lymphocytes 31.9 %    % Monocytes 7.7 %    % Eosinophils 3.4 %    % Basophils 0.8 %    % Immature Granulocytes 0.5 %    Absolute Neutrophil 4.4 1.6 - 8.3 10e9/L    Absolute Lymphocytes 2.5 0.8 - 5.3 10e9/L    Absolute Monocytes 0.6 0.0 - 1.3 10e9/L    Absolute Eosinophils 0.3 0.0 - 0.7 10e9/L    Absolute Basophils 0.1 0.0 - 0.2 10e9/L    Abs Immature Granulocytes 0.0 0 - 0.4 10e9/L   Comprehensive metabolic panel   Result Value Ref Range    Sodium 137 134 - 144 mmol/L    Potassium 4.1 3.5 - 5.1 mmol/L    Chloride 102 98 - 107 mmol/L    Carbon Dioxide 28 21 - 31 mmol/L    Anion Gap 7 3 - 14 mmol/L    Glucose 99 70 - 105 mg/dL    Urea Nitrogen 22 7 - 25 mg/dL    Creatinine 0.87 0.70 - 1.30 mg/dL    GFR Estimate 86 >60 mL/min/1.7m2    GFR Estimate If Black >90 >60 mL/min/1.7m2    Calcium 9.5 8.6 - 10.3 mg/dL    Bilirubin Total 0.8 0.3 - 1.0 mg/dL    Albumin 4.4 3.5 - 5.7 g/dL    Protein Total 7.4 6.4 - 8.9 g/dL     Alkaline Phosphatase 63 34 - 104 U/L    ALT 16 7 - 52 U/L    AST 20 13 - 39 U/L   Prostate Specific Antigen GH   Result Value Ref Range    Prostate Specific Antigen 2.206 <3.100 ng/mL   Lipid Profile   Result Value Ref Range    Cholesterol 193 <200 mg/dL    Triglycerides 102 <150 mg/dL    HDL Cholesterol 62 23 - 92 mg/dL    LDL Cholesterol Calculated 111 (H) <100 mg/dL    Non HDL Cholesterol 131 (H) <130 mg/dL

## 2018-09-13 NOTE — MR AVS SNAPSHOT
"              After Visit Summary   9/13/2018    Doroteo Martinez    MRN: [id number]           Patient Information     Date Of Birth          1945        Visit Information        Provider Department      9/13/2018 10:45 AM Yong Diaz MD [facility]        Today's Diagnoses     Hyperlipidemia, unspecified hyperlipidemia type    -  1    Atherosclerosis of coronary artery of native heart without angina pectoris, unspecified vessel or lesion type        Essential hypertension        Prostate disorder        Ulcerative colitis without complications, unspecified location (H)        Primary osteoarthritis of both wrists           Follow-ups after your visit        Who to contact     If you have questions or need follow up information about today's clinic visit or your schedule please contact [facility] AND [facility] directly at [phone].  Normal or non-critical lab and imaging results will be communicated to you by TextureMediahart, letter or phone within 4 business days after the clinic has received the results. If you do not hear from us within 7 days, please contact the clinic through TextureMediahart or phone. If you have a critical or abnormal lab result, we will notify you by phone as soon as possible.  Submit refill requests through Geotender or call your pharmacy and they will forward the refill request to us. Please allow 3 business days for your refill to be completed.          Additional Information About Your Visit        [employer] Information     Geotender lets you send messages to your doctor, view your test results, renew your prescriptions, schedule appointments and more. To sign up, go to [email or web address] . Click on \"Log in\" on the left side of the screen, which will take you to the Welcome page. Then click on \"Sign up Now\" on the right side of the page.     You will be asked to enter the access code listed below, as well as some personal information. Please follow the " "directions to create your username and password.     Your access code is: [email or web address]  Expires: 2018 10:35 AM     Your access code will  in 90 days. If you need help or a new code, please call your [place] clinic or [phone].        Care EveryWhere ID     This is your Care EveryWhere ID. This could be used by other organizations to access your [place] medical records  [id number]        Your Vitals Were     Pulse Temperature Height BMI (Body Mass Index)          72 97.2  F (36.2  C) 5' 5\" (1.651 m) 23.1 kg/m2         Blood Pressure from Last 3 Encounters:   18 (!) 146/94   17 146/86   17 128/72    Weight from Last 3 Encounters:   18 138 lb 12.8 oz (63 kg)   17 138 lb 12.8 oz (63 kg)   17 143 lb 3.2 oz (65 kg)              We Performed the Following     CBC with platelets differential     Comprehensive metabolic panel     Lipid Profile     Prostate Specific Antigen GH          Today's Medication Changes          These changes are accurate as of 18 11:50 AM.  If you have any questions, ask your nurse or doctor.               Start taking these medicines.        Dose/Directions    diclofenac 1 % Gel topical gel   Commonly known as:  VOLTAREN   Used for:  Primary osteoarthritis of both wrists   Started by:  Yong Diaz MD        Apply 4 grams to knees or 2 grams to hands four times daily using enclosed dosing card.   Quantity:  100 g   Refills:  1            Where to get your medicines      These medications were sent to [id number] - [place], [place] - [address]  [address], [facility] [address]     Phone:  [phone]     atorvastatin 40 MG tablet    diclofenac 1 % Gel topical gel                Primary Care Provider Office Phone # Fax #    Yong Diaz [phone]       [address]  [facility] [address]        Equal Access to Services     ELLIOT THOMAS AH: Hadii fransisco castillo " caterina Gamboa, wabarryda duglasara, qasueta kajoe hill, ryan juniorin hayaan simeulogio kourtneyemily lavidaaurea christiano. So [facility] [phone].    ATENCIÓN: Si habla estradaañol, tiene a tiwari disposición servicios gratuitos de asistencia lingüística. Ana al [phone].    We comply with applicable federal civil rights laws and [place] laws. We do not discriminate on the basis of race, color, national origin, age, disability, sex, sexual orientation, or gender identity.            Thank you!     Thank you for choosing [facility] [place]  for your care. Our goal is always to provide you with excellent care. Hearing back from our patients is one way we can continue to improve our services. Please take a few minutes to complete the written survey that you may receive in the mail after your visit with us. Thank you!             Your Updated Medication List - Protect others around you: Learn how to safely use, store and throw away your medicines at www.disposemymeds.org.          This list is accurate as of 9/13/18 11:50 AM.  Always use your most recent med list.                   Brand Name Dispense Instructions for use Diagnosis    atorvastatin 40 MG tablet    LIPITOR    90 tablet    Take 1 tablet (40 mg) by mouth daily    Hyperlipidemia, unspecified hyperlipidemia type       Blood Pressure Monitor Misc      As directed.        diclofenac 1 % Gel topical gel    VOLTAREN    100 g    Apply 4 grams to knees or 2 grams to hands four times daily using enclosed dosing card.    Primary osteoarthritis of both wrists

## 2018-09-13 NOTE — NURSING NOTE
Patient presents to clinic for medication check.  Meg Stapleton ....................  9/13/2018   10:35 AM

## 2018-10-16 ENCOUNTER — ALLIED HEALTH/NURSE VISIT (OUTPATIENT)
Dept: FAMILY MEDICINE | Facility: OTHER | Age: 73
End: 2018-10-16
Attending: FAMILY MEDICINE
Payer: COMMERCIAL

## 2018-10-16 DIAGNOSIS — Z23 NEED FOR PROPHYLACTIC VACCINATION AND INOCULATION AGAINST INFLUENZA: Primary | ICD-10-CM

## 2018-10-16 PROCEDURE — G0008 ADMIN INFLUENZA VIRUS VAC: HCPCS

## 2018-10-16 PROCEDURE — 90662 IIV NO PRSV INCREASED AG IM: CPT

## 2018-10-16 NOTE — MR AVS SNAPSHOT
After Visit Summary   10/16/2018    Doroteo Martinez    MRN: [id number]           Patient Information     Date Of Birth          1945        Visit Information        Provider Department      10/16/2018 2:00 PM [facility]        Today's Diagnoses     Need for prophylactic vaccination and inoculation against influenza    -  1       Follow-ups after your visit        Who to contact     If you have questions or need follow up information about today's clinic visit or your schedule please contact [facility] directly at [phone].  Normal or non-critical lab and imaging results will be communicated to you by [email or web address], letter or phone within 4 business days after the clinic has received the results. If you do not hear from us within 7 days, please contact the clinic through Solafeet or phone. If you have a critical or abnormal lab result, we will notify you by phone as soon as possible.  Submit refill requests through Solafeet or call your pharmacy and they will forward the refill request to us. Please allow 3 business days for your refill to be completed.          Additional Information About Your Visit        MyChart Information     [employer]ola[employer] gives you secure access to your electronic health record. If you see a primary care provider, you can also send messages to your care team and make appointments. If you have questions, please call your primary care clinic.  If you do not have a primary care provider, please call [phone] and they will assist you.        Care EveryWhere ID     This is your Care EveryWhere ID. This could be used by other organizations to access your [place] medical records  [id number]         Blood Pressure from Last 3 Encounters:   09/13/18 (!) 146/94   08/02/17 146/86   06/14/17 128/72    Weight from Last 3 Encounters:   09/13/18 138 lb 12.8 oz (63 kg)   08/02/17 138 lb 12.8 oz (63 kg)   06/14/17 143 lb 3.2 oz (65 kg)               We Performed the Following     HC FLU VACCINE, INCREASED ANTIGEN, PRESV FREE        Primary Care Provider Office Phone # Fax #    Yong VAZQUEZ [phone] [address] [facility] [address]        Equal Access to Services     ELLIOT MARTHA : Hadii fransisco ku hadderricko Soomaali, waaxda luqadaha, qaybta kaalmada adeegyada, ryan juniorin hayaan simeulogio donnelly lavidaaurea . So [facility] [phone].    ATENCIÓN: Si habla español, tiene a tiwari disposición servicios gratuitos de asistencia lingüística. Llame al [phone].    We comply with applicable federal civil rights laws and [place] laws. We do not discriminate on the basis of race, color, national origin, age, disability, sex, sexual orientation, or gender identity.            Thank you!     Thank you for choosing [facility] AND [facility]  for your care. Our goal is always to provide you with excellent care. Hearing back from our patients is one way we can continue to improve our services. Please take a few minutes to complete the written survey that you may receive in the mail after your visit with us. Thank you!             Your Updated Medication List - Protect others around you: Learn how to safely use, store and throw away your medicines at www.disposemymeds.org.          This list is accurate as of 10/16/18  2:04 PM.  Always use your most recent med list.                   Brand Name Dispense Instructions for use Diagnosis    atorvastatin 40 MG tablet    LIPITOR    90 tablet    Take 1 tablet (40 mg) by mouth daily    Hyperlipidemia, unspecified hyperlipidemia type       Blood Pressure Monitor Misc      As directed.        diclofenac 1 % Gel topical gel    VOLTAREN    100 g    Apply 4 grams to knees or 2 grams to hands four times daily using enclosed dosing card.    Primary osteoarthritis of both wrists

## 2018-10-16 NOTE — PROGRESS NOTES

## 2019-05-07 ENCOUNTER — OFFICE VISIT (OUTPATIENT)
Dept: FAMILY MEDICINE | Facility: OTHER | Age: 74
End: 2019-05-07
Attending: FAMILY MEDICINE
Payer: COMMERCIAL

## 2019-05-07 VITALS
DIASTOLIC BLOOD PRESSURE: 86 MMHG | WEIGHT: 143 LBS | HEIGHT: 63 IN | BODY MASS INDEX: 25.34 KG/M2 | HEART RATE: 88 BPM | SYSTOLIC BLOOD PRESSURE: 160 MMHG | TEMPERATURE: 97 F | RESPIRATION RATE: 16 BRPM

## 2019-05-07 DIAGNOSIS — S16.1XXD STRAIN OF NECK MUSCLE, SUBSEQUENT ENCOUNTER: Primary | ICD-10-CM
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
## 2019-05-07 PROCEDURE — 99213 OFFICE O/P EST LOW 20 MIN: CPT | Performed by: FAMILY MEDICINE

## 2019-05-07 ASSESSMENT — PAIN SCALES - GENERAL: PAINLEVEL: MILD PAIN (3)

## 2019-05-07 ASSESSMENT — MIFFLIN-ST. JEOR: SCORE: 1292.73
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
## 2019-06-20 ENCOUNTER — MYC MEDICAL ADVICE (OUTPATIENT)
Dept: FAMILY MEDICINE | Facility: OTHER | Age: 74
End: 2019-06-20

## 2019-07-01 ENCOUNTER — ALLIED HEALTH/NURSE VISIT (OUTPATIENT)
Dept: FAMILY MEDICINE | Facility: OTHER | Age: 74
End: 2019-07-01
Payer: COMMERCIAL

## 2019-07-01 VITALS — DIASTOLIC BLOOD PRESSURE: 90 MMHG | SYSTOLIC BLOOD PRESSURE: 162 MMHG | HEART RATE: 84 BPM

## 2019-07-01 DIAGNOSIS — Z00.00 ROUTINE GENERAL MEDICAL EXAMINATION AT A HEALTH CARE FACILITY: Primary | ICD-10-CM

## 2019-07-01 NOTE — NURSING NOTE
Patient presented for blood pressure check today with nurse, 162/90, P-84.    Has been checking at home, readings have been 6/24- 182/105, 164/102.  6/26- 158/98, 145/99, 6//98, 157/95, 6/30- 159/96, 156/91.      Will be heading out of town 7/2-7/7.    Angelina Csatellon LPN ...... 7/1/2019 10:26 AM

## 2019-07-05 ENCOUNTER — MYC REFILL (OUTPATIENT)
Dept: FAMILY MEDICINE | Facility: OTHER | Age: 74
End: 2019-07-05

## 2019-07-05 DIAGNOSIS — E78.5 HYPERLIPIDEMIA, UNSPECIFIED HYPERLIPIDEMIA TYPE: ICD-10-CM

## 2019-07-05 RX ORDER — ATORVASTATIN CALCIUM 40 MG/1
40 TABLET, FILM COATED ORAL DAILY
Qty: 90 TABLET | Refills: 0 | Status: SHIPPED | OUTPATIENT
Start: 2019-07-05 | End: 2019-07-17

## 2019-07-05 NOTE — TELEPHONE ENCOUNTER
"Requested Prescriptions   Pending Prescriptions Disp Refills     atorvastatin (LIPITOR) 40 MG tablet 90 tablet 4     Sig: Take 1 tablet (40 mg) by mouth daily       Statins Protocol Passed - 7/5/2019  9:41 AM        Passed - LDL on file in past 12 months     Recent Labs   Lab Test 09/13/18  1101   *             Passed - No abnormal creatine kinase in past 12 months     No lab results found.             Passed - Recent (12 mo) or future (30 days) visit within the authorizing provider's specialty     Patient had office visit in the last 12 months or has a visit in the next 30 days with authorizing provider or within the authorizing provider's specialty.  See \"Patient Info\" tab in inbasket, or \"Choose Columns\" in Meds & Orders section of the refill encounter.              Passed - Medication is active on med list        Passed - Patient is age 18 or older        lov 05/17/19  Prescription approved per [facility] Refill Protocol.    "

## 2019-07-16 RX ORDER — MOXIFLOXACIN 5 MG/ML
SOLUTION/ DROPS OPHTHALMIC
COMMUNITY
Start: 2019-06-28 | End: 2020-05-13

## 2019-07-16 RX ORDER — PREDNISOLONE ACETATE 10 MG/ML
SUSPENSION/ DROPS OPHTHALMIC
COMMUNITY
Start: 2019-06-28 | End: 2020-05-13

## 2019-07-16 RX ORDER — KETOROLAC TROMETHAMINE 5 MG/ML
SOLUTION OPHTHALMIC
COMMUNITY
Start: 2019-06-28 | End: 2020-05-13

## 2019-07-17 ENCOUNTER — OFFICE VISIT (OUTPATIENT)
Dept: FAMILY MEDICINE | Facility: OTHER | Age: 74
End: 2019-07-17
Attending: FAMILY MEDICINE
Payer: COMMERCIAL

## 2019-07-17 VITALS
DIASTOLIC BLOOD PRESSURE: 82 MMHG | WEIGHT: 137 LBS | HEART RATE: 88 BPM | SYSTOLIC BLOOD PRESSURE: 126 MMHG | TEMPERATURE: 97.3 F | BODY MASS INDEX: 24.27 KG/M2 | RESPIRATION RATE: 16 BRPM | HEIGHT: 63 IN | OXYGEN SATURATION: 96 %
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
## 2019-07-17 DIAGNOSIS — Z01.818 PREOPERATIVE EXAMINATION: Primary | ICD-10-CM

## 2019-07-17 DIAGNOSIS — I25.10 ATHEROSCLEROSIS OF CORONARY ARTERY OF NATIVE HEART WITHOUT ANGINA PECTORIS, UNSPECIFIED VESSEL OR LESION TYPE: ICD-10-CM

## 2019-07-17 DIAGNOSIS — E78.5 HYPERLIPIDEMIA, UNSPECIFIED HYPERLIPIDEMIA TYPE: ICD-10-CM

## 2019-07-17 DIAGNOSIS — Z88.8 ASPIRIN ALLERGY: ICD-10-CM

## 2019-07-17 DIAGNOSIS — K51.90 ULCERATIVE COLITIS WITHOUT COMPLICATIONS, UNSPECIFIED LOCATION (H): ICD-10-CM

## 2019-07-17 DIAGNOSIS — H26.9 CATARACT OF LEFT EYE, UNSPECIFIED CATARACT TYPE: ICD-10-CM

## 2019-07-17 LAB
ALBUMIN SERPL-MCNC: 3.9 G/DL (ref 3.5–5.7)
ALP SERPL-CCNC: 66 U/L (ref 34–104)
ALT SERPL W P-5'-P-CCNC: 26 U/L (ref 7–52)
ANION GAP SERPL CALCULATED.3IONS-SCNC: 6 MMOL/L (ref 3–14)
AST SERPL W P-5'-P-CCNC: 40 U/L (ref 13–39)
BASOPHILS # BLD AUTO: 0 10E9/L (ref 0–0.2)
BASOPHILS NFR BLD AUTO: 0 %
BILIRUB SERPL-MCNC: 0.6 MG/DL (ref 0.3–1)
BUN SERPL-MCNC: 19 MG/DL (ref 7–25)
CALCIUM SERPL-MCNC: 9.3 MG/DL (ref 8.6–10.3)
CHLORIDE SERPL-SCNC: 102 MMOL/L (ref 98–107)
CHOLEST SERPL-MCNC: 152 MG/DL
CO2 SERPL-SCNC: 28 MMOL/L (ref 21–31)
CREAT SERPL-MCNC: 0.9 MG/DL (ref 0.7–1.3)
DIFFERENTIAL METHOD BLD: ABNORMAL
EOSINOPHIL # BLD AUTO: 0 10E9/L (ref 0–0.7)
EOSINOPHIL NFR BLD AUTO: 0 %
ERYTHROCYTE [DISTWIDTH] IN BLOOD BY AUTOMATED COUNT: 13.4 % (ref 10–15)
GFR SERPL CREATININE-BSD FRML MDRD: 83 ML/MIN/{1.73_M2}
GLUCOSE SERPL-MCNC: 130 MG/DL (ref 70–105)
HCT VFR BLD AUTO: 45.9 % (ref 40–53)
HDLC SERPL-MCNC: 41 MG/DL (ref 23–92)
HGB BLD-MCNC: 15.4 G/DL (ref 13.3–17.7)
LDLC SERPL CALC-MCNC: 49 MG/DL
LYMPHOCYTES # BLD AUTO: 0.7 10E9/L (ref 0.8–5.3)
LYMPHOCYTES NFR BLD AUTO: 24 %
MCH RBC QN AUTO: 30.3 PG (ref 26.5–33)
MCHC RBC AUTO-ENTMCNC: 33.6 G/DL (ref 31.5–36.5)
MCV RBC AUTO: 90 FL (ref 78–100)
MONOCYTES # BLD AUTO: 0.1 10E9/L (ref 0–1.3)
MONOCYTES NFR BLD AUTO: 4 %
NEUTROPHILS # BLD AUTO: 1.7 10E9/L (ref 1.6–8.3)
NEUTROPHILS NFR BLD AUTO: 56 %
NONHDLC SERPL-MCNC: 111 MG/DL
OTHER CELLS # BLD MANUAL: 0.5 10E9/L
OTHER CELLS NFR BLD MANUAL: 16 %
PLATELET # BLD AUTO: 148 10E9/L (ref 150–450)
PLATELET # BLD EST: ABNORMAL 10*3/UL
POTASSIUM SERPL-SCNC: 3.7 MMOL/L (ref 3.5–5.1)
PROT SERPL-MCNC: 6.8 G/DL (ref 6.4–8.9)
RBC # BLD AUTO: 5.09 10E12/L (ref 4.4–5.9)
RBC MORPH BLD: ABNORMAL
SODIUM SERPL-SCNC: 136 MMOL/L (ref 134–144)
TRIGL SERPL-MCNC: 312 MG/DL
WBC # BLD AUTO: 3 10E9/L (ref 4–11)

## 2019-07-17 PROCEDURE — 36415 COLL VENOUS BLD VENIPUNCTURE: CPT | Mod: ZL | Performed by: FAMILY MEDICINE

## 2019-07-17 PROCEDURE — 80053 COMPREHEN METABOLIC PANEL: CPT | Mod: ZL | Performed by: FAMILY MEDICINE

## 2019-07-17 PROCEDURE — 80061 LIPID PANEL: CPT | Mod: ZL | Performed by: FAMILY MEDICINE

## 2019-07-17 PROCEDURE — 85025 COMPLETE CBC W/AUTO DIFF WBC: CPT | Mod: ZL | Performed by: FAMILY MEDICINE

## 2019-07-17 PROCEDURE — G0463 HOSPITAL OUTPT CLINIC VISIT: HCPCS

## 2019-07-17 PROCEDURE — 86618 LYME DISEASE ANTIBODY: CPT | Mod: ZL | Performed by: FAMILY MEDICINE

## 2019-07-17 PROCEDURE — 99214 OFFICE O/P EST MOD 30 MIN: CPT | Performed by: FAMILY MEDICINE

## 2019-07-17 RX ORDER — ATORVASTATIN CALCIUM 40 MG/1
40 TABLET, FILM COATED ORAL DAILY
Qty: 90 TABLET | Refills: 11 | Status: SHIPPED | OUTPATIENT
Start: 2019-07-17 | End: 2020-05-13

## 2019-07-17 ASSESSMENT — MIFFLIN-ST. JEOR: SCORE: 1261.56

## 2019-07-17 ASSESSMENT — PAIN SCALES - GENERAL: PAINLEVEL: MODERATE PAIN (4)

## 2019-07-17 NOTE — NURSING NOTE
"Chief Complaint   Patient presents with     Pre-Op Exam     surgery date 07/24/2019       Initial /82   Pulse 88   Temp 97.3  F (36.3  C) (Temporal)   Resp 16   Ht 1.6 m (5' 3\")   Wt 62.1 kg (137 lb)   SpO2 96%   BMI 24.27 kg/m   Estimated body mass index is 24.27 kg/m  as calculated from the following:    Height as of this encounter: 1.6 m (5' 3\").    Weight as of this encounter: 62.1 kg (137 lb).  Medication Reconciliation: complete    Leticia Schroeder LPN     Date of Surgery: 07/24/2019  Type of Surgery: Left Cataract  Surgeon: Dr Davisonno  Hospital[facility]  [facility]      Fever/Chills or other infectious symptoms in past month: no  >10lb weight loss in past two months: no    Health Care Directive/Code status:  yes  Hx of blood transfusions:   no   Td up to date:  yes  History of VRE/MRSA:  no     Preoperative Evaluation: Obstructive Sleep Apnea screening    S:Snore -  Do you snore loudly? (louder than talking or loud enough to be heard through closed doors) yes  T: Tired - Do you often feel tired, fatigued, or sleepy during the daytime? no  O: Observed - Has anyone ever observed you stop breathing during your sleep? no  P: Pressure - Do you have or are you being treated for high blood pressure? no  B: BMI - BMI greater than 35kg/m2? no  A: Age - Age over 50 years old? yes  N: Neck - Neck circumference greater than 40 cm? no  G: Gender - Gender: Male? yes    Total number of \"YES\" responses:  3    Scoring: Low risk of ADALBERTO 0-2  At Risk of ADALBERTO: >3 High Risk of ADALBERTO: 5-8    Leticia Schroeder LPN........................7/17/2019  3:33 PM            "

## 2019-07-17 NOTE — PROGRESS NOTES
"Nursing Notes:   Leticia Schroeder LPN  7/17/2019  3:35 PM  Signed  Chief Complaint   Patient presents with     Pre-Op Exam     surgery date 07/24/2019       Initial /82   Pulse 88   Temp 97.3  F (36.3  C) (Temporal)   Resp 16   Ht 1.6 m (5' 3\")   Wt 62.1 kg (137 lb)   SpO2 96%   BMI 24.27 kg/m    Estimated body mass index is 24.27 kg/m  as calculated from the following:    Height as of this encounter: 1.6 m (5' 3\").    Weight as of this encounter: 62.1 kg (137 lb).  Medication Reconciliation: complete    Leticia Schroeder LPN     Date of Surgery: 07/24/2019  Type of Surgery: Left Cataract  Surgeon: Dr Maldonado  Hospital:  [facility]      Fever/Chills or other infectious symptoms in past month: no  >10lb weight loss in past two months: no    Health Care Directive/Code status:  yes  Hx of blood transfusions:   no   Td up to date:  yes  History of VRE/MRSA:  no     Preoperative Evaluation: Obstructive Sleep Apnea screening    S:Snore -  Do you snore loudly? (louder than talking or loud enough to be heard through closed doors) yes  T: Tired - Do you often feel tired, fatigued, or sleepy during the daytime? no  O: Observed - Has anyone ever observed you stop breathing during your sleep? no  P: Pressure - Do you have or are you being treated for high blood pressure? no  B: BMI - BMI greater than 35kg/m2? no  A: Age - Age over 50 years old? yes  N: Neck - Neck circumference greater than 40 cm? no  G: Gender - Gender: Male? yes    Total number of \"YES\" responses:  3    Scoring: Low risk of ADALBERTO 0-2  At Risk of ADALBERTO: >3 High Risk of ADALBERTO: 5-8    Leticia Schroeder LPN........................7/17/2019  3:33 PM              ----------------- PREOPERATIVE EXAM ------------------  7/17/2019    SUBJECTIVE:  Doroteo Martinez is a 74 year old male here for preoperative optimization.    Preoperative risk assessment consultation was requested on Doroteo Martinez by Dr. Maldonado  prior to left cataract " extraction and IOL.   This is scheduled for July 24, 2019 at [facility]. I am asked to see this patient for preoperative clearance prior to this procedure.     He is in overall good health.  He has a history of coronary disease with MI and stenting back in the 1990s who has been well controlled and asymptomatic for years.  He is on Lipitor.  He is not on aspirin because of an allergy.  He has not had AAA screening.  He has a history of ulcerative colitis which has been well controlled.    He had a deer tick bite Memorial Day weekend. No rash.  Would like to be checked for Lyme.     Patient Active Problem List    Diagnosis Date Noted     Colitis, ulcerative (H) 03/08/2018     Priority: Medium     Coronary atherosclerosis 03/08/2018     Priority: Medium     Diverticular disease of colon 03/08/2018     Priority: Medium     Hypertension 03/08/2018     Priority: Medium     Other and unspecified diseases of appendix 03/08/2018     Priority: Medium     ACP (advance care planning) 12/10/2013     Priority: Medium     Arthritis of right wrist 09/11/2013     Priority: Medium     Abdominal wall hernia 10/13/2011     Priority: Medium     Hyperlipidemia 07/13/2011     Priority: Medium     Fistula, intestinovesical 04/26/2011     Priority: Medium       Past Medical History:   Diagnosis Date     Calculus of kidney     1970s     Hyperlipidemia     No Comments Provided     ST elevation (STEMI) myocardial infarction (H)     1997,Stents     Ulcerative colitis without complications (H)     No Comments Provided       Past Surgical History:   Procedure Laterality Date     COLON SURGERY      4/2011,colon resection with removal of 18 inches of colon and repair of colovesicular fistula Dr. Dawson     [facility]     COLONOSCOPY  04/01/2011     HEART CATH, ANGIOPLASTY      1997,MI with stenting     OTHER SURGICAL HISTORY      1997,[id number],SCAN-ANGIOGRAM,coronary angiography  "with stenting [facility]     right cataract extraction with IOL         Family History   Problem Relation Age of Onset     Other - See Comments Mother         Dementia     Other - See Comments Father         COPD       Social History     Tobacco Use     Smoking status: Former Smoker     Last attempt to quit: 1976     Years since quittin.8     Smokeless tobacco: Never Used   Substance Use Topics     Alcohol use: Yes     Alcohol/week: 0.0 oz     Comment: Alcoholic Drinks/day: daily wine with dinner 3-4 at times     Drug use: No     Comment: Drug use: No       Current Outpatient Medications   Medication Sig Dispense Refill     ASPIRIN NOT PRESCRIBED (INTENTIONAL) Please choose reason not prescribed, below       atorvastatin (LIPITOR) 40 MG tablet Take 1 tablet (40 mg) by mouth daily 90 tablet 11     Blood Pressure Monitor MISC As directed.       ketorolac (ACULAR) 0.5 % ophthalmic solution        moxifloxacin (VIGAMOX) 0.5 % ophthalmic solution        prednisoLONE acetate (PRED FORTE) 1 % ophthalmic suspension          Allergies:  Allergies   Allergen Reactions     Aspirin Other (See Comments)     colitis       ROS:    Surgical:  patient denies previous complications from prior surgeries including but not limited to prolonged bleeding, anesthesia complications, dysrhythmias, surgical wound infections, or prolonged hospital stay.    Denies family hx of bleeding tendencies, anesthesia complications, or other problems with surgery.     -------------------------------------------------------------    PHYSICAL EXAM:  /82   Pulse 88   Temp 97.3  F (36.3  C) (Temporal)   Resp 16   Ht 1.6 m (5' 3\")   Wt 62.1 kg (137 lb)   SpO2 96%   BMI 24.27 kg/m      EXAM:  General Appearance: Pleasant, alert, appropriate appearance for age. No acute distress  Head Exam: Normal. Normocephalic, atraumatic.  Eyes: PERRL, EOMI  Ears: Normal TM's bilaterally. Normal auditory canals and external ears.   OroPharynx: Normal " buccal mucosa. Normal pharynx.  Neck: Supple, no masses or nodes, no lymphadenopathy.  No thyromegaly.  Lungs: Normal chest wall and respirations. Clear to auscultation, no wheezes or crackles.  Cardiovascular: Regular rate and rhythm. S1, S2, no murmurs.  Gastrointestinal: Soft, nontender, no abnormal masses or organomegaly. BS normal   Musculoskeletal: No edema.  Skin: no concerning or new rashes.  Neurologic Exam: CN 2-12 grossly intact.  Normal gait.  Normal gross motor movement, tone, and coordination. No tremor.  Psychiatric Exam: Alert and oriented, appropriate affect.      EKG: not indicated  ---------------------------------------------------------------  LABS  Results for orders placed or performed in visit on 07/17/19   Lipid Profile   Result Value Ref Range    Cholesterol 152 <200 mg/dL    Triglycerides 312 (H) <150 mg/dL    HDL Cholesterol 41 23 - 92 mg/dL    LDL Cholesterol Calculated 49 <100 mg/dL    Non HDL Cholesterol 111 <130 mg/dL   Comprehensive metabolic panel   Result Value Ref Range    Sodium 136 134 - 144 mmol/L    Potassium 3.7 3.5 - 5.1 mmol/L    Chloride 102 98 - 107 mmol/L    Carbon Dioxide 28 21 - 31 mmol/L    Anion Gap 6 3 - 14 mmol/L    Glucose 130 (H) 70 - 105 mg/dL    Urea Nitrogen 19 7 - 25 mg/dL    Creatinine 0.90 0.70 - 1.30 mg/dL    GFR Estimate 83 >60 mL/min/[1.73_m2]    GFR Estimate If Black >90 >60 mL/min/[1.73_m2]    Calcium 9.3 8.6 - 10.3 mg/dL    Bilirubin Total 0.6 0.3 - 1.0 mg/dL    Albumin 3.9 3.5 - 5.7 g/dL    Protein Total 6.8 6.4 - 8.9 g/dL    Alkaline Phosphatase 66 34 - 104 U/L    ALT 26 7 - 52 U/L    AST 40 (H) 13 - 39 U/L   CBC with platelets differential   Result Value Ref Range    WBC 3.0 (L) 4.0 - 11.0 10e9/L    RBC Count 5.09 4.4 - 5.9 10e12/L    Hemoglobin 15.4 13.3 - 17.7 g/dL    Hematocrit 45.9 40.0 - 53.0 %    MCV 90 78 - 100 fl    MCH 30.3 26.5 - 33.0 pg    MCHC 33.6 31.5 - 36.5 g/dL    RDW 13.4 10.0 - 15.0 %    Platelet Count 148 (L) 150 - 450 10e9/L     Diff Method PENDING        ASSESSEMENT AND PLAN:    Doroteo was seen today for pre-op exam.    Diagnoses and all orders for this visit:    Preoperative examination  -     CBC with platelets differential; Future  -     Comprehensive metabolic panel; Future  -     Comprehensive metabolic panel  -     CBC with platelets differential    Aspirin allergy    Atherosclerosis of coronary artery of native heart without angina pectoris, unspecified vessel or lesion type    Cataract of left eye, unspecified cataract type    Hyperlipidemia, unspecified hyperlipidemia type  -     Comprehensive metabolic panel; Future  -     Lipid Profile; Future  -     Lipid Profile  -     Comprehensive metabolic panel  -     atorvastatin (LIPITOR) 40 MG tablet; Take 1 tablet (40 mg) by mouth daily    Ulcerative colitis without complications, unspecified location (H)  -     CBC with platelets differential; Future  -     CBC with platelets differential    Tick bite, initial encounter  -     Lyme Disease Melany with reflex to WB Serum; Future  -     Lyme Disease Melany with reflex to WB Serum        PRE OP RECOMMENDATIONS:  Patient is on chronic pain medications no   Patient is on antiplatlet/anticoagulation medication no  Other medications that need adjustment perioperatively no    Other:  Patient was advised to call our office and the surgical services with any change in condition or new symptoms if they were to develop between today and their surgical date.  Especially any cardiopulmonary symptoms or symptoms concerning for an infection.    Will notify of lab results when available. Discussed diet, exercise and healthy lifestyle changes. Continue current medications.     Low risk for planned procedure.      Yong Diaz 7/17/2019

## 2019-07-19 LAB — B BURGDOR IGG+IGM SER QL: 0.06 (ref 0–0.89)

## 2019-10-16 ENCOUNTER — ALLIED HEALTH/NURSE VISIT (OUTPATIENT)
Dept: FAMILY MEDICINE | Facility: OTHER | Age: 74
End: 2019-10-16
Payer: COMMERCIAL

## 2019-10-16 DIAGNOSIS — Z23 NEED FOR PROPHYLACTIC VACCINATION AND INOCULATION AGAINST INFLUENZA: Primary | ICD-10-CM

## 2019-10-16 PROCEDURE — G0008 ADMIN INFLUENZA VIRUS VAC: HCPCS

## 2019-10-16 PROCEDURE — 90662 IIV NO PRSV INCREASED AG IM: CPT

## 2020-03-11 ENCOUNTER — HEALTH MAINTENANCE LETTER (OUTPATIENT)
Age: 75
End: 2020-03-11

## 2020-05-04 ENCOUNTER — MYC MEDICAL ADVICE (OUTPATIENT)
Dept: FAMILY MEDICINE | Facility: OTHER | Age: 75
End: 2020-05-04

## 2020-05-13 ENCOUNTER — VIRTUAL VISIT (OUTPATIENT)
Dept: FAMILY MEDICINE | Facility: OTHER | Age: 75
End: 2020-05-13
Attending: FAMILY MEDICINE
Payer: COMMERCIAL

## 2020-05-13 VITALS — WEIGHT: 145 LBS | BODY MASS INDEX: 25.69 KG/M2

## 2020-05-13 DIAGNOSIS — K51.90 ULCERATIVE COLITIS WITHOUT COMPLICATIONS, UNSPECIFIED LOCATION (H): ICD-10-CM

## 2020-05-13 DIAGNOSIS — E78.5 HYPERLIPIDEMIA, UNSPECIFIED HYPERLIPIDEMIA TYPE: ICD-10-CM

## 2020-05-13 DIAGNOSIS — N42.9 PROSTATE DISORDER: ICD-10-CM

## 2020-05-13 DIAGNOSIS — I25.10 ATHEROSCLEROSIS OF CORONARY ARTERY OF NATIVE HEART WITHOUT ANGINA PECTORIS, UNSPECIFIED VESSEL OR LESION TYPE: ICD-10-CM

## 2020-05-13 DIAGNOSIS — I10 ESSENTIAL HYPERTENSION: Primary | ICD-10-CM

## 2020-05-13 PROCEDURE — 99213 OFFICE O/P EST LOW 20 MIN: CPT | Mod: 95 | Performed by: FAMILY MEDICINE

## 2020-05-13 RX ORDER — ATORVASTATIN CALCIUM 40 MG/1
40 TABLET, FILM COATED ORAL DAILY
Qty: 90 TABLET | Refills: 11 | Status: SHIPPED | OUTPATIENT
Start: 2020-05-13 | End: 2022-09-01

## 2020-05-13 RX ORDER — ACETAMINOPHEN 160 MG
TABLET,DISINTEGRATING ORAL
COMMUNITY

## 2020-05-13 ASSESSMENT — PAIN SCALES - GENERAL: PAINLEVEL: NO PAIN (0)

## 2020-05-13 NOTE — PROGRESS NOTES
"Doroteo Martinez is a 75 year old male who is being evaluated via a billable video visit.    Yong Diaz MD on 5/13/2020 at 12:42 PM    The patient has been notified of following:     \"This video visit will be conducted via a call between you and your physician/provider. We have found that certain health care needs can be provided without the need for an in-person physical exam.  This service lets us provide the care you need with a video conversation.  If a prescription is necessary we can send it directly to your pharmacy.  If lab work is needed we can place an order for that and you can then stop by our lab to have the test done at a later time.    Video visits are billed at different rates depending on your insurance coverage.  Please reach out to your insurance provider with any questions.    If during the course of the call the physician/provider feels a video visit is not appropriate, you will not be charged for this service.\"    Patient has given verbal consent for Video visit? Yes    How would you like to obtain your AVS? [employer]    Patient would like the video invitation sent by: Text to cell phone: [phone]    Will anyone else be joining your video visit? No       There are no exam notes on file for this visit.      Subjective     Doroteo Martinez is a 75 year old male who presents today via video visit for the following health issues:    JAMIR Tellez is having a video visit today for follow-up and medication management.  I saw him last summer for preop prior to cataract surgery.  He is in overall good health and has a history of coronary disease with MI and stenting back in the 1990s but has been well controlled and asymptomatic for years.  He continues on Lipitor.  He is allergic to aspirin.  He has a history of ulcerative colitis which has been well controlled.  His last colonoscopy was uncertain.  He is status post laparoscopic low anterior resection with splenic flexure mobilization and " repair of colovesical fistula in .      He is due for annual labs.  He has not had AAA screening.  He is up-to-date on health maintenance issues with the exception of Shingrix.    They had been sick and may have had Covid, but didn't get tested. It was a long lasting influenza-like illness. He was not as sick as his wife. They have now been home for about a month.  They wintered in [place] and left there .    He is re-doing his pontoon.     His blood pressure at home has been 120's/60's    Video Start Time: 1:24 PM      Patient Active Problem List   Diagnosis     Abdominal wall hernia     ACP (advance care planning)     Arthritis of right wrist     Colitis, ulcerative (H)     Coronary atherosclerosis     Diverticular disease of colon     Fistula, intestinovesical     Hyperlipidemia     Hypertension     Other and unspecified diseases of appendix     Past Surgical History:   Procedure Laterality Date     COLON SURGERY      2011,colon resection with removal of 18 inches of colon and repair of colovesicular fistula Dr. Dawson     [facility]     COLONOSCOPY  2011     HEART CATH, ANGIOPLASTY      ,MI with stenting     OTHER SURGICAL HISTORY      ,[id number],SCAN-ANGIOGRAM,coronary angiography with stenting [facility]     right cataract extraction with IOL         Social History     Tobacco Use     Smoking status: Former Smoker     Last attempt to quit: 1976     Years since quittin.7     Smokeless tobacco: Never Used   Substance Use Topics     Alcohol use: Yes     Alcohol/week: 0.0 standard drinks     Comment: Alcoholic Drinks/day: daily wine with dinner 3-4 at times     Family History   Problem Relation Age of Onset     Other - See Comments Mother         Dementia     Other - See Comments Father         COPD         Current Outpatient Medications   Medication Sig Dispense Refill     atorvastatin (LIPITOR) 40 MG tablet Take 1 tablet (40  "mg) by mouth daily 90 tablet 11     Blood Pressure Monitor MISC As directed.       Cholecalciferol (VITAMIN D3) 50 MCG (2000 UT) CAPS        sod hyaluronate-sod chondroitinoitin-sod hyaluronate (DUOVISC) 0.55-0.5 ML ophthalmic kit Place into both eyes once for 1 dose       ASPIRIN NOT PRESCRIBED (INTENTIONAL) Please choose reason not prescribed, below       Allergies   Allergen Reactions     Aspirin Other (See Comments)     colitis       Reviewed and updated as needed this visit by Provider         Review of Systems   ROS is negative except as noted above       Objective    Wt 65.8 kg (145 lb)   BMI 25.69 kg/m    Estimated body mass index is 25.69 kg/m  as calculated from the following:    Height as of 7/17/19: 1.6 m (5' 3\").    Weight as of this encounter: 65.8 kg (145 lb).  Physical Exam     GENERAL: Healthy, alert and no distress  EYES: Eyes grossly normal to inspection.  No discharge or erythema, or obvious scleral/conjunctival abnormalities.  RESP: No audible wheeze, cough, or visible cyanosis.  No visible retractions or increased work of breathing.    SKIN: Visible skin clear. No significant rash, abnormal pigmentation or lesions.  NEURO: Cranial nerves grossly intact.  Mentation and speech appropriate for age.  PSYCH: Mentation appears normal, affect normal/bright, judgement and insight intact, normal speech and appearance well-groomed.      Diagnostic Test Results:  Labs reviewed in [facility]        Doroteo was seen today for medication refill.    Diagnoses and all orders for this visit:    Essential hypertension  -     Comprehensive metabolic panel; Future  -     CBC with platelets differential; Future    Atherosclerosis of coronary artery of native heart without angina pectoris, unspecified vessel or lesion type  -     Comprehensive metabolic panel; Future    Hyperlipidemia, unspecified hyperlipidemia type  -     Comprehensive metabolic panel; Future  -     CBC with platelets differential; Future  -     Lipid " Profile; Future  -     atorvastatin (LIPITOR) 40 MG tablet; Take 1 tablet (40 mg) by mouth daily    Ulcerative colitis without complications, unspecified location (H)  -     Comprehensive metabolic panel; Future    Prostate disorder  -     Prostate Specific Antigen GH; Future      Labs ordered.  Meds renewed but he wants to wait until July for that.       Video-Visit Details    Type of service:  Video Visit    Video End Time:1:43 PM    Originating Location (pt. Location): Home    Distant Location (provider location):  [facility]     Platform used for Video Visit: [employer]    No follow-ups on file.       Yong Diaz MD

## 2020-06-25 DIAGNOSIS — K51.90 ULCERATIVE COLITIS WITHOUT COMPLICATIONS, UNSPECIFIED LOCATION (H): ICD-10-CM

## 2020-06-25 DIAGNOSIS — I10 ESSENTIAL HYPERTENSION: ICD-10-CM

## 2020-06-25 DIAGNOSIS — E78.5 HYPERLIPIDEMIA, UNSPECIFIED HYPERLIPIDEMIA TYPE: ICD-10-CM

## 2020-06-25 DIAGNOSIS — N42.9 PROSTATE DISORDER: ICD-10-CM

## 2020-06-25 DIAGNOSIS — I25.10 ATHEROSCLEROSIS OF CORONARY ARTERY OF NATIVE HEART WITHOUT ANGINA PECTORIS, UNSPECIFIED VESSEL OR LESION TYPE: ICD-10-CM

## 2020-06-25 LAB
ALBUMIN SERPL-MCNC: 4.4 G/DL (ref 3.5–5.7)
ALP SERPL-CCNC: 61 U/L (ref 34–104)
ALT SERPL W P-5'-P-CCNC: 16 U/L (ref 7–52)
ANION GAP SERPL CALCULATED.3IONS-SCNC: 6 MMOL/L (ref 3–14)
AST SERPL W P-5'-P-CCNC: 21 U/L (ref 13–39)
BASOPHILS # BLD AUTO: 0.1 10E9/L (ref 0–0.2)
BASOPHILS NFR BLD AUTO: 0.6 %
BILIRUB SERPL-MCNC: 0.7 MG/DL (ref 0.3–1)
BUN SERPL-MCNC: 17 MG/DL (ref 7–25)
CALCIUM SERPL-MCNC: 9.5 MG/DL (ref 8.6–10.3)
CHLORIDE SERPL-SCNC: 103 MMOL/L (ref 98–107)
CHOLEST SERPL-MCNC: 192 MG/DL
CO2 SERPL-SCNC: 30 MMOL/L (ref 21–31)
CREAT SERPL-MCNC: 0.96 MG/DL (ref 0.7–1.3)
DIFFERENTIAL METHOD BLD: NORMAL
EOSINOPHIL # BLD AUTO: 0.4 10E9/L (ref 0–0.7)
EOSINOPHIL NFR BLD AUTO: 3.8 %
ERYTHROCYTE [DISTWIDTH] IN BLOOD BY AUTOMATED COUNT: 13.8 % (ref 10–15)
GFR SERPL CREATININE-BSD FRML MDRD: 77 ML/MIN/{1.73_M2}
GLUCOSE SERPL-MCNC: 92 MG/DL (ref 70–105)
HCT VFR BLD AUTO: 47.8 % (ref 40–53)
HDLC SERPL-MCNC: 63 MG/DL (ref 23–92)
HGB BLD-MCNC: 15.7 G/DL (ref 13.3–17.7)
IMM GRANULOCYTES # BLD: 0.1 10E9/L (ref 0–0.4)
IMM GRANULOCYTES NFR BLD: 0.5 %
LDLC SERPL CALC-MCNC: 103 MG/DL
LYMPHOCYTES # BLD AUTO: 2.9 10E9/L (ref 0.8–5.3)
LYMPHOCYTES NFR BLD AUTO: 26.3 %
MCH RBC QN AUTO: 29.8 PG (ref 26.5–33)
MCHC RBC AUTO-ENTMCNC: 32.8 G/DL (ref 31.5–36.5)
MCV RBC AUTO: 91 FL (ref 78–100)
MONOCYTES # BLD AUTO: 0.8 10E9/L (ref 0–1.3)
MONOCYTES NFR BLD AUTO: 6.9 %
NEUTROPHILS # BLD AUTO: 6.7 10E9/L (ref 1.6–8.3)
NEUTROPHILS NFR BLD AUTO: 61.9 %
NONHDLC SERPL-MCNC: 129 MG/DL
PLATELET # BLD AUTO: 303 10E9/L (ref 150–450)
POTASSIUM SERPL-SCNC: 4.5 MMOL/L (ref 3.5–5.1)
PROT SERPL-MCNC: 7.3 G/DL (ref 6.4–8.9)
PSA SERPL-MCNC: 2.27 NG/ML
RBC # BLD AUTO: 5.26 10E12/L (ref 4.4–5.9)
SODIUM SERPL-SCNC: 139 MMOL/L (ref 134–144)
TRIGL SERPL-MCNC: 129 MG/DL
WBC # BLD AUTO: 10.8 10E9/L (ref 4–11)

## 2020-06-25 PROCEDURE — 36415 COLL VENOUS BLD VENIPUNCTURE: CPT | Mod: ZL | Performed by: FAMILY MEDICINE

## 2020-06-25 PROCEDURE — 80053 COMPREHEN METABOLIC PANEL: CPT | Mod: ZL | Performed by: FAMILY MEDICINE

## 2020-06-25 PROCEDURE — 84153 ASSAY OF PSA TOTAL: CPT | Mod: ZL | Performed by: FAMILY MEDICINE

## 2020-06-25 PROCEDURE — 85025 COMPLETE CBC W/AUTO DIFF WBC: CPT | Mod: ZL | Performed by: FAMILY MEDICINE

## 2020-06-25 PROCEDURE — 80061 LIPID PANEL: CPT | Mod: ZL | Performed by: FAMILY MEDICINE

## 2020-07-09 ENCOUNTER — MYC REFILL (OUTPATIENT)
Dept: FAMILY MEDICINE | Facility: OTHER | Age: 75
End: 2020-07-09

## 2020-07-09 DIAGNOSIS — E78.5 HYPERLIPIDEMIA, UNSPECIFIED HYPERLIPIDEMIA TYPE: ICD-10-CM

## 2020-07-14 RX ORDER — ATORVASTATIN CALCIUM 40 MG/1
40 TABLET, FILM COATED ORAL DAILY
Qty: 90 TABLET | Refills: 11 | OUTPATIENT
Start: 2020-07-14

## 2020-07-14 NOTE — TELEPHONE ENCOUNTER
" Disp  Refills  Start  End  LISA    atorvastatin (LIPITOR) 40 MG tablet  90 tablet  11  5/13/2020   No    Sig - Route: Take 1 tablet (40 mg) by mouth daily - Oral        LOV: 5/13/2020  Future Office visit: No future appointment scheduled at this time.    Patient should have refills at their pharmacy.    Requested Prescriptions   Pending Prescriptions Disp Refills     atorvastatin (LIPITOR) 40 MG tablet 90 tablet 11     Sig: Take 1 tablet (40 mg) by mouth daily       Statins Protocol Passed - 7/9/2020 11:52 AM        Passed - LDL on file in past 12 months     Recent Labs   Lab Test 06/25/20  1051   *             Passed - No abnormal creatine kinase in past 12 months     No lab results found.             Passed - Recent (12 mo) or future (30 days) visit within the authorizing provider's specialty     Patient has had an office visit with the authorizing provider or a provider within the authorizing providers department within the previous 12 mos or has a future within next 30 days. See \"Patient Info\" tab in inbasket, or \"Choose Columns\" in Meds & Orders section of the refill encounter.              Passed - Medication is active on med list        Passed - Patient is age 18 or older         Jennifer Dawson RN  ....................  7/14/2020   8:56 AM      "

## 2020-10-14 ENCOUNTER — ALLIED HEALTH/NURSE VISIT (OUTPATIENT)
Dept: FAMILY MEDICINE | Facility: OTHER | Age: 75
End: 2020-10-14
Attending: FAMILY MEDICINE
Payer: COMMERCIAL

## 2020-10-14 DIAGNOSIS — Z23 NEED FOR PROPHYLACTIC VACCINATION AND INOCULATION AGAINST INFLUENZA: Primary | ICD-10-CM

## 2020-10-14 PROCEDURE — G0008 ADMIN INFLUENZA VIRUS VAC: HCPCS

## 2021-04-24 ENCOUNTER — MYC MEDICAL ADVICE (OUTPATIENT)
Dept: FAMILY MEDICINE | Facility: OTHER | Age: 76
End: 2021-04-24

## 2021-04-25 ENCOUNTER — HEALTH MAINTENANCE LETTER (OUTPATIENT)
Age: 76
End: 2021-04-25

## 2021-09-27 ENCOUNTER — OFFICE VISIT (OUTPATIENT)
Dept: FAMILY MEDICINE | Facility: OTHER | Age: 76
End: 2021-09-27
Attending: PHYSICIAN ASSISTANT
Payer: COMMERCIAL

## 2021-09-27 VITALS
RESPIRATION RATE: 18 BRPM | TEMPERATURE: 97.3 F | OXYGEN SATURATION: 97 % | SYSTOLIC BLOOD PRESSURE: 176 MMHG | HEART RATE: 84 BPM | BODY MASS INDEX: 22.93 KG/M2 | DIASTOLIC BLOOD PRESSURE: 76 MMHG | WEIGHT: 134.31 LBS | HEIGHT: 64 IN
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
## 2021-09-27 PROCEDURE — G0463 HOSPITAL OUTPT CLINIC VISIT: HCPCS

## 2021-09-27 PROCEDURE — 99213 OFFICE O/P EST LOW 20 MIN: CPT | Performed by: NURSE PRACTITIONER

## 2021-09-27 RX ORDER — DOXYCYCLINE 100 MG/1
200 CAPSULE ORAL ONCE
Qty: 2 CAPSULE | Refills: 0 | Status: SHIPPED | OUTPATIENT
Start: 2021-09-27 | End: 2021-09-27

## 2021-09-27 ASSESSMENT — PAIN SCALES - GENERAL: PAINLEVEL: NO PAIN (0)

## 2021-09-27 ASSESSMENT — MIFFLIN-ST. JEOR: SCORE: 1255.24

## 2021-09-27 NOTE — PATIENT INSTRUCTIONS
Doxycycline 200 mg one time for prophylactic dosing    No lab work needed today as it is too soon for accurate results     Wash tick bite with soap and water. Apply antibiotic ointment to site 1-2 times daily until healed.    Watch for flu like illness such as fevers and chills; arthritis type pain such as joint pains and stiffness.     Follow up if development of any of these symptoms for further evaluation.   Patient Education     Tick Bite, Antibiotic Treatment     Ticks are small arachnids that feed on the blood of rodents, rabbits, birds, deer, dogs and humans. The bite may cause a small reaction like that of a spider, with a small amount of limited redness, itching and slight swelling. Sometimes there is no local reaction.  Most tick bites are harmless. But some ticks carry diseases, such as Lyme disease or [place] spotted fever. These can be passed to people at the time of the bite. Lyme disease is of greatest concern. Right now you have no symptoms of Lyme disease or other serious reaction to the bite. It's important to watch for the warning signs, which could appear weeks to months after the tick bite.  Home care  These guidelines can help you care for your bite at home:    If itching is a problem, don't wear tight clothing or anything that heats up your skin. This includes hot showers or baths and direct sunlight. This often makes the itching worse.    An ice pack will reduce local areas of redness and itching. Make your own ice pack by putting ice cubes in a zip-top plastic bag and wrapping it in a thin towel. Over-the-counter creams containing benzocaine may help with itching.    You can use an antihistamine with diphenhydramine if your healthcare provider didn't give you another antihistamine. This medicine may be used to reduce itching if large areas of the skin are involved. It's available at drugstores and grocery stores. Use caution because this medicine may cause drowsiness. If symptoms  continue, talk with your healthcare provider or pharmacist about other over-the counter or prescription medicines that may be helpful.    Your healthcare provider may prescribe antibiotics to reduce your risk of getting Lyme disease. It's very important that you take them exactly as directed until they are completely finished.  Follow-up care  Follow up with your healthcare provider, or as advised.  Call 911  Call 911 if any of these occur:    Irregular or rapid heartbeat    Numbness, tingling, or weakness in the arms or legs  When to seek medical advice  Call your healthcare provider right away if any of these occur:  Signs of local infection. Watch for these during the next few days:    Increasing redness around the bite site    Increased pain or swelling    Fever over 100.4 F (38 C), or as directed by your healthcare provider    Fluid draining from the bite area  Signs of tick-related disease. Watch for these over the next few weeks to months:    Circular, red, ring-like rash appears at the bite area within 1 to 3 weeks    Tiredness, fever or chills, nausea or vomiting    Neck pain or stiffness, headache, or confusion    Muscle or bone aches    Joint pain or swelling, especially in the knee    Weakness on one side of the face    A spotted rash that starts on or includes the palms of the hands and soles of the feet  [employer] last reviewed this educational content on 12/1/2019 2000-2021 [employer]. All rights reserved. This information is not intended as a substitute for professional medical care. Always follow your healthcare professional's instructions.

## 2021-09-27 NOTE — PROGRESS NOTES
ASSESSMENT/PLAN:  1. Tick bite, initial encounter    - doxycycline hyclate (VIBRAMYCIN) 100 MG capsule; Take 2 capsules (200 mg) by mouth once for 1 dose  Dispense: 2 capsule; Refill: 0      Chlorhexidene was used to cleanse the two affected areas and then a blunt needle was used to remove a portion of the embedded tick from the patient's right mid back. Attempted using a blunt needle and forceps to remove the remaining piece from the patient's left aspect of his chest, this was unsuccessful.     Doxycycline 200 mg one time for prophylactic dosing    No lab work needed today as it is too soon for accurate results     Wash tick bite with soap and water. Apply antibiotic ointment to site 1-2 times daily until healed.    Watch for flu like illness such as fevers and chills; arthritis type pain such as joint pains and stiffness.     Follow up if development of any of these symptoms for further evaluation.       Discussed warning signs/symptoms indicative of need to f/u    Follow up if symptoms persist or worsen or concerns      I explained my diagnostic considerations and recommendations to the patient, who voiced understanding and agreement with the treatment plan. All questions were answered. We discussed potential side effects of any prescribed or recommended therapies, as well as expectations for response to treatments.        HPI:    Doroteo Martinez is a 76 year old male  who presents to [facility] today for two embedded deer ticks. One tick was attached to her left side of his chest and one on her right side mid back. Found the first one yesterday and the second one today. Unsure of when these first attached. Denies fevers. The patient's family members had attempted to remove the ticks, but believes that there is still a portion of the tick that is embedded. The patient had also placed epsom salt with a bandage to cover the tick bites.     Past Medical History:   Diagnosis Date     Calculus of kidney      "1970s     Hyperlipidemia     No Comments Provided     ST elevation (STEMI) myocardial infarction (H)     ,Stents     Ulcerative colitis without complications (H)     No Comments Provided     Past Surgical History:   Procedure Laterality Date     COLON SURGERY      2011,colon resection with removal of 18 inches of colon and repair of colovesicular fistula Dr. Dawson     COLONOSCOPY  2012,with biopsy, Dr. aBrr, [facility]     COLONOSCOPY  2011     HEART CATH, ANGIOPLASTY      ,MI with stenting     OTHER SURGICAL HISTORY      ,2074,SC[facility]ANGIOGRAM,[facility]     right cataract extraction with IOL       Social History     Tobacco Use     Smoking status: Former Smoker     Quit date: 1976     Years since quittin.0     Smokeless tobacco: Never Used   Substance Use Topics     Alcohol use: Yes     Alcohol/week: 0.0 standard drinks     Comment: Alcoholic Drinks/day: daily wine with dinner 3-4 at times     Current Outpatient Medications   Medication Sig Dispense Refill     atorvastatin (LIPITOR) 40 MG tablet Take 1 tablet (40 mg) by mouth daily 90 tablet 11     Blood Pressure Monitor MISC As directed.       Cholecalciferol (VITAMIN D3) 50 MCG (2000) CAPS        sod hyaluronate-sod chondroitinoitin-sod hyaluronate (DUOVISC) 0.55-0.5 ML ophthalmic kit Place into both eyes once for 1 dose       ASPIRIN NOT PRESCRIBED (INTENTIONAL) Please choose reason not prescribed, below       Allergies   Allergen Reactions     Aspirin Other (See Comments)     colitis         Past medical history, past surgical history, current medications and allergies reviewed and accurate to the best of my knowledge.        ROS:  Refer to HPI    BP (!) 174/96 (BP Location: Left arm, Patient Position: Sitting, Cuff Size: Adult Regular)   Pulse 84   Temp 97.3  F (36.3  C) (Tympanic)   Resp 18   Ht 1.626 m (5' 4\")   Wt 60.9 kg (134 lb 5 oz)   SpO2 97%   BMI 23.05 kg/m  "     EXAM:  General Appearance: Well appearing male, appropriate appearance for age. No acute distress  Dermatological: Erythematous with ecchymosis and darkened center to the patient's left chest that was approximately 0.5-1.0 cm in diameter. Erythematous with darkened center that was approximately 0.5-1.0 cm in diameter.   Psychological: normal affect, alert, oriented, and pleasant.

## 2021-09-27 NOTE — NURSING NOTE
Patient presents to clinic after finding two embedded deer ticks yesterday.  One is attached by left breast and one is on right side mid back.  Med Rec Complete.  Katie Eli LPN............9/27/2021 3:21 PM

## 2021-10-09 ENCOUNTER — HEALTH MAINTENANCE LETTER (OUTPATIENT)
Age: 76
End: 2021-10-09

## 2022-05-21 ENCOUNTER — HEALTH MAINTENANCE LETTER (OUTPATIENT)
Age: 77
End: 2022-05-21

## 2022-09-01 ENCOUNTER — OFFICE VISIT (OUTPATIENT)
Dept: FAMILY MEDICINE | Facility: OTHER | Age: 77
End: 2022-09-01
Attending: FAMILY MEDICINE
Payer: COMMERCIAL

## 2022-09-01 VITALS
WEIGHT: 133 LBS | TEMPERATURE: 97.1 F | RESPIRATION RATE: 16 BRPM | BODY MASS INDEX: 23.57 KG/M2 | HEART RATE: 77 BPM | OXYGEN SATURATION: 99 % | HEIGHT: 63 IN | SYSTOLIC BLOOD PRESSURE: 162 MMHG | DIASTOLIC BLOOD PRESSURE: 96 MMHG

## 2022-09-01 DIAGNOSIS — K51.90 ULCERATIVE COLITIS WITHOUT COMPLICATIONS, UNSPECIFIED LOCATION (H): ICD-10-CM

## 2022-09-01 DIAGNOSIS — I25.10 ATHEROSCLEROSIS OF CORONARY ARTERY OF NATIVE HEART WITHOUT ANGINA PECTORIS, UNSPECIFIED VESSEL OR LESION TYPE: ICD-10-CM

## 2022-09-01 DIAGNOSIS — N42.9 PROSTATE DISORDER: ICD-10-CM

## 2022-09-01 DIAGNOSIS — E78.5 HYPERLIPIDEMIA, UNSPECIFIED HYPERLIPIDEMIA TYPE: ICD-10-CM

## 2022-09-01 DIAGNOSIS — R41.3 MEMORY PROBLEM: Primary | ICD-10-CM

## 2022-09-01 DIAGNOSIS — R41.89 PSEUDODEMENTIA: ICD-10-CM

## 2022-09-01 DIAGNOSIS — I10 PRIMARY HYPERTENSION: ICD-10-CM

## 2022-09-01 LAB
ALBUMIN SERPL-MCNC: 4.3 G/DL (ref 3.5–5.7)
ALP SERPL-CCNC: 57 U/L (ref 34–104)
ALT SERPL W P-5'-P-CCNC: 13 U/L (ref 7–52)
ANION GAP SERPL CALCULATED.3IONS-SCNC: 7 MMOL/L (ref 3–14)
AST SERPL W P-5'-P-CCNC: 15 U/L (ref 13–39)
BASOPHILS # BLD AUTO: 0 10E3/UL (ref 0–0.2)
BASOPHILS NFR BLD AUTO: 1 %
BILIRUB SERPL-MCNC: 0.5 MG/DL (ref 0.3–1)
BUN SERPL-MCNC: 19 MG/DL (ref 7–25)
CALCIUM SERPL-MCNC: 9.8 MG/DL (ref 8.6–10.3)
CHLORIDE BLD-SCNC: 103 MMOL/L (ref 98–107)
CHOLEST SERPL-MCNC: 278 MG/DL
CO2 SERPL-SCNC: 27 MMOL/L (ref 21–31)
CREAT SERPL-MCNC: 0.99 MG/DL (ref 0.7–1.3)
EOSINOPHIL # BLD AUTO: 0.4 10E3/UL (ref 0–0.7)
EOSINOPHIL NFR BLD AUTO: 5 %
ERYTHROCYTE [DISTWIDTH] IN BLOOD BY AUTOMATED COUNT: 13.7 % (ref 10–15)
FASTING STATUS PATIENT QL REPORTED: NO
FOLATE SERPL-MCNC: 11.2 NG/ML
GFR SERPL CREATININE-BSD FRML MDRD: 79 ML/MIN/1.73M2
GLUCOSE BLD-MCNC: 91 MG/DL (ref 70–105)
HCT VFR BLD AUTO: 48.3 % (ref 40–53)
HDLC SERPL-MCNC: 59 MG/DL (ref 23–92)
HGB BLD-MCNC: 16.4 G/DL (ref 13.3–17.7)
IMM GRANULOCYTES # BLD: 0.1 10E3/UL
IMM GRANULOCYTES NFR BLD: 1 %
LDLC SERPL CALC-MCNC: 170 MG/DL
LYMPHOCYTES # BLD AUTO: 2.8 10E3/UL (ref 0.8–5.3)
LYMPHOCYTES NFR BLD AUTO: 33 %
MCH RBC QN AUTO: 30.3 PG (ref 26.5–33)
MCHC RBC AUTO-ENTMCNC: 34 G/DL (ref 31.5–36.5)
MCV RBC AUTO: 89 FL (ref 78–100)
MONOCYTES # BLD AUTO: 0.6 10E3/UL (ref 0–1.3)
MONOCYTES NFR BLD AUTO: 8 %
NEUTROPHILS # BLD AUTO: 4.5 10E3/UL (ref 1.6–8.3)
NEUTROPHILS NFR BLD AUTO: 52 %
NONHDLC SERPL-MCNC: 219 MG/DL
NRBC # BLD AUTO: 0 10E3/UL
NRBC BLD AUTO-RTO: 0 /100
PLATELET # BLD AUTO: 301 10E3/UL (ref 150–450)
POTASSIUM BLD-SCNC: 4 MMOL/L (ref 3.5–5.1)
PROT SERPL-MCNC: 7.5 G/DL (ref 6.4–8.9)
PSA SERPL-MCNC: 2.36 UG/L (ref 0–4)
RBC # BLD AUTO: 5.42 10E6/UL (ref 4.4–5.9)
SODIUM SERPL-SCNC: 137 MMOL/L (ref 134–144)
TRIGL SERPL-MCNC: 247 MG/DL
TSH SERPL DL<=0.005 MIU/L-ACNC: 1.93 MU/L (ref 0.4–4)
VIT B12 SERPL-MCNC: 802 PG/ML (ref 180–914)
WBC # BLD AUTO: 8.4 10E3/UL (ref 4–11)

## 2022-09-01 PROCEDURE — 82746 ASSAY OF FOLIC ACID SERUM: CPT | Mod: ZL | Performed by: FAMILY MEDICINE

## 2022-09-01 PROCEDURE — 36415 COLL VENOUS BLD VENIPUNCTURE: CPT | Mod: ZL | Performed by: FAMILY MEDICINE

## 2022-09-01 PROCEDURE — 82607 VITAMIN B-12: CPT | Mod: ZL | Performed by: FAMILY MEDICINE

## 2022-09-01 PROCEDURE — 85025 COMPLETE CBC W/AUTO DIFF WBC: CPT | Mod: ZL | Performed by: FAMILY MEDICINE

## 2022-09-01 PROCEDURE — 84443 ASSAY THYROID STIM HORMONE: CPT | Mod: ZL | Performed by: FAMILY MEDICINE

## 2022-09-01 PROCEDURE — 84153 ASSAY OF PSA TOTAL: CPT | Mod: ZL | Performed by: FAMILY MEDICINE

## 2022-09-01 PROCEDURE — G0463 HOSPITAL OUTPT CLINIC VISIT: HCPCS

## 2022-09-01 PROCEDURE — 80053 COMPREHEN METABOLIC PANEL: CPT | Mod: ZL | Performed by: FAMILY MEDICINE

## 2022-09-01 PROCEDURE — 99215 OFFICE O/P EST HI 40 MIN: CPT | Performed by: FAMILY MEDICINE

## 2022-09-01 PROCEDURE — 80061 LIPID PANEL: CPT | Mod: ZL | Performed by: FAMILY MEDICINE

## 2022-09-01 RX ORDER — ATORVASTATIN CALCIUM 40 MG/1
40 TABLET, FILM COATED ORAL DAILY
Qty: 90 TABLET | Refills: 11 | Status: SHIPPED | OUTPATIENT
Start: 2022-09-01 | End: 2022-09-01

## 2022-09-01 ASSESSMENT — PATIENT HEALTH QUESTIONNAIRE - PHQ9
10. IF YOU CHECKED OFF ANY PROBLEMS, HOW DIFFICULT HAVE THESE PROBLEMS MADE IT FOR YOU TO DO YOUR WORK, TAKE CARE OF THINGS AT HOME, OR GET ALONG WITH OTHER PEOPLE: NOT DIFFICULT AT ALL
SUM OF ALL RESPONSES TO PHQ QUESTIONS 1-9: 0
SUM OF ALL RESPONSES TO PHQ QUESTIONS 1-9: 0

## 2022-09-01 ASSESSMENT — PAIN SCALES - GENERAL: PAINLEVEL: NO PAIN (0)

## 2022-09-01 NOTE — NURSING NOTE
"Chief Complaint   Patient presents with     Memory Loss     Gotten worse over past 2 months       Initial BP (!) 162/96   Pulse 77   Temp 97.1  F (36.2  C) (Temporal)   Resp 16   Ht 1.588 m (5' 2.5\")   Wt 60.3 kg (133 lb)   SpO2 99%   BMI 23.94 kg/m   Estimated body mass index is 23.94 kg/m  as calculated from the following:    Height as of this encounter: 1.588 m (5' 2.5\").    Weight as of this encounter: 60.3 kg (133 lb).  Medication Reconciliation: complete    FOOD SECURITY SCREENING QUESTIONS  Hunger Vital Signs:  Within the past 12 months we worried whether our food would run out before we got money to buy more. Never  Within the past 12 months the food we bought just didn't last and we didn't have money to get more. Never        Advance care directive on file? yes      Leticia Schroeder LPN  "

## 2022-09-01 NOTE — PATIENT INSTRUCTIONS
Start sertraline, taking 1/2 tab daily for 4 days, increasing to a whole tab daily.  Follow up in one month.

## 2022-09-01 NOTE — PROGRESS NOTES
"      Earl Sadler is a 77 year old accompanied by his spouse, presenting for the following health issues:  Memory Loss (Gotten worse over past 2 months)      History of Present Illness       Reason for visit:  Memory:    He eats 0-1 servings of fruits and vegetables daily.He consumes 1 sweetened beverage(s) daily.He exercises with enough effort to increase his heart rate 20 to 29 minutes per day.  He exercises with enough effort to increase his heart rate 3 or less days per week.   He is taking medications regularly.    Today's PHQ-9         PHQ-9 Total Score: 0    PHQ-9 Q9 Thoughts of better off dead/self-harm past 2 weeks :   Not at all    How difficult have these problems made it for you to do your work, take care of things at home, or get along with other people: Not difficult at all             Review of Systems         Objective    BP (!) 162/96   Pulse 77   Temp 97.1  F (36.2  C) (Temporal)   Resp 16   Ht 1.588 m (5' 2.5\")   Wt 60.3 kg (133 lb)   SpO2 99%   BMI 23.94 kg/m    Body mass index is 23.94 kg/m .  Physical Exam                       [unfilled]  @[facility] [place]@  "

## 2022-09-01 NOTE — PROGRESS NOTES
"Nursing Notes:   Leticia Schroeder, LPN  9/1/2022  1:37 PM  Signed  Chief Complaint   Patient presents with     Memory Loss     Gotten worse over past 2 months       Initial BP (!) 162/96   Pulse 77   Temp 97.1  F (36.2  C) (Temporal)   Resp 16   Ht 1.588 m (5' 2.5\")   Wt 60.3 kg (133 lb)   SpO2 99%   BMI 23.94 kg/m   Estimated body mass index is 23.94 kg/m  as calculated from the following:    Height as of this encounter: 1.588 m (5' 2.5\").    Weight as of this encounter: 60.3 kg (133 lb).  Medication Reconciliation: complete    FOOD SECURITY SCREENING QUESTIONS  Hunger Vital Signs:  Within the past 12 months we worried whether our food would run out before we got money to buy more. Never  Within the past 12 months the food we bought just didn't last and we didn't have money to get more. Never        Advance care directive on file? yes      Leticia Schroeder LPN      SUBJECTIVE:  Doroteo Martinez  is a 77 year old male who comes in today for follow-up.  I last saw him virtually in May 2020. He is in overall good health and has a history of coronary disease with MI and stenting back in the 1990s but has been well controlled and asymptomatic for years.  He was on Lipitor, but he stopped it. He was having some confusion and stopped it and the confusion seemed to improve.   He is allergic to aspirin.  He has a history of ulcerative colitis which has been well controlled.  His last colonoscopy was uncertain.  He is status post laparoscopic low anterior resection with splenic flexure mobilization and repair of colovesical fistula in 2011.    He has been taking some delta 9. His frustration is better.  He has \"lost his nouns\" and has trouble finding a word.  He can't name things. He still drives, but his wife will help with navigation. He will forget where they are going. He tends to be overcautious with driving. He can concentrate on a project.  He will be in a conversation and will lose his train of thought. He " seems to be more compulsive with certain things like unloading the . He will put things in the wrong.     His mother had dementia in her 70's.     He is up-to-date on immunizations.  Had COVID-19 vaccine elsewhere.  Had his booster in September 2021. They aren't sure if they have had Covid or not. They have not had a positive test. They were sick in March 2020.  He has had Zostavax but not Shingrix.  He is up-to-date on Boostrix.  He has had pneumonia vaccine.    They came back in late April and couldn't get in to the house and had to get a new furnace. The slider for the RV broke.  They likely won't take the RV this winter and may house sit.     Past Medical, Family, and Social History reviewed and updated as noted below.   ROS is negative except as noted above       Allergies   Allergen Reactions     Aspirin Other (See Comments)     colitis   ,   Family History   Problem Relation Age of Onset     Other - See Comments Mother         Dementia     Other - See Comments Father         COPD   ,   Current Outpatient Medications   Medication     ASPIRIN NOT PRESCRIBED (INTENTIONAL)     Blood Pressure Monitor MISC     Cholecalciferol (VITAMIN D3) 50 MCG (2000 UT) CAPS     sertraline (ZOLOFT) 50 MG tablet     sod hyaluronate-sod chondroitinoitin-sod hyaluronate (DUOVISC) 0.55-0.5 ML ophthalmic kit     No current facility-administered medications for this visit.   ,   Past Medical History:   Diagnosis Date     Calculus of kidney     1970s     Hyperlipidemia     No Comments Provided     ST elevation (STEMI) myocardial infarction (H)     1997,Stents     Ulcerative colitis without complications (H)     No Comments Provided   ,   Patient Active Problem List    Diagnosis Date Noted     Colitis, ulcerative (H) 03/08/2018     Priority: Medium     Coronary atherosclerosis 03/08/2018     Priority: Medium     Diverticular disease of colon 03/08/2018     Priority: Medium     Hypertension 03/08/2018     Priority: Medium      "Other and unspecified diseases of appendix 2018     Priority: Medium     ACP (advance care planning) 12/10/2013     Priority: Medium     Arthritis of right wrist 2013     Priority: Medium     Abdominal wall hernia 10/13/2011     Priority: Medium     Hyperlipidemia 2011     Priority: Medium     Fistula, intestinovesical 2011     Priority: Medium   ,   Past Surgical History:   Procedure Laterality Date     COLON SURGERY      2011,colon resection with removal of 18 inches of colon and repair of colovesicular fistula Dr. Dawson     COLONOSCOPY  2012,with biopsy, Dr. Barr,      COLONOSCOPY  2011     HEART CATH, ANGIOPLASTY      ,MI with stenting     left cataract extraction with IOL  2019    Dr. Maldonado     OTHER SURGICAL HISTORY      ,2074,SCAN-ANGIOGRAM,coronary angiography with stenting [facility]     right cataract extraction with IOL      and   Social History     Tobacco Use     Smoking status: Former Smoker     Quit date: 1976     Years since quittin.0     Smokeless tobacco: Never Used   Substance Use Topics     Alcohol use: Yes     Alcohol/week: 0.0 standard drinks     Comment: Alcoholic Drinks/day: daily wine with dinner 3-4 at times     OBJECTIVE:  BP (!) 162/96   Pulse 77   Temp 97.1  F (36.2  C) (Temporal)   Resp 16   Ht 1.588 m (5' 2.5\")   Wt 60.3 kg (133 lb)   SpO2 99%   BMI 23.94 kg/m     EXAM:  General Appearance: Pleasant, alert, appropriate appearance for age. No acute distress  Head Exam: Normal. Normocephalic, atraumatic.  Eye Exam:  Normal external eyes, conjunctivae, lids, cornea. SHANDRA. EOMI  Ear Exam: Normal TM's bilaterally. Normal auditory canals and external ears. Non-tender.  Nose Exam: Normal external nose, mucus membranes, and septum.  OroPharynx Exam:  Dental hygiene adequate. Normal buccal mucosa. Normal pharynx.  Neck Exam:  Supple, no masses or nodes. No audible bruits  Thyroid Exam: No nodules or " enlargement.  Chest/Respiratory Exam: Normal chest wall and respirations. Clear to auscultation.  Cardiovascular Exam: Regular rate and rhythm. S1, S2, no murmur, click, gallop, or rubs.  Gastrointestinal Exam: Soft, non-tender, no masses or organomegaly.  Lymphatic Exam: Non-palpable nodes in neck, clavicular regions.  Musculoskeletal Exam: Back is straight and non-tender, full ROM of upper and lower extremities.  Foot Exam: Left and right foot: good pedal pulses  Skin: no rash or abnormalities  Neurologic Exam: Nonfocal, normal gross motor, tone coordination and no tremor.  He has normal gait and heel and toe walk as well as tandem gait.  He has no cerebellar findings on exam.  Speech is smooth and coherent.  Repetition is normal but naming is not.  He cannot name a pen or a paperclip but did describe what they were for.  Psychiatric Exam: Alert and oriented - appropriate affect.  No formal thought disorder    Results for orders placed or performed in visit on 09/01/22   Comprehensive Metabolic Panel     Status: Normal   Result Value Ref Range    Sodium 137 134 - 144 mmol/L    Potassium 4.0 3.5 - 5.1 mmol/L    Chloride 103 98 - 107 mmol/L    Carbon Dioxide (CO2) 27 21 - 31 mmol/L    Anion Gap 7 3 - 14 mmol/L    Urea Nitrogen 19 7 - 25 mg/dL    Creatinine 0.99 0.70 - 1.30 mg/dL    Calcium 9.8 8.6 - 10.3 mg/dL    Glucose 91 70 - 105 mg/dL    Alkaline Phosphatase 57 34 - 104 U/L    AST 15 13 - 39 U/L    ALT 13 7 - 52 U/L    Protein Total 7.5 6.4 - 8.9 g/dL    Albumin 4.3 3.5 - 5.7 g/dL    Bilirubin Total 0.5 0.3 - 1.0 mg/dL    GFR Estimate 79 >60 mL/min/1.73m2   Lipid Panel     Status: Abnormal   Result Value Ref Range    Cholesterol 278 (H) <200 mg/dL    Triglycerides 247 (H) <150 mg/dL    Direct Measure HDL 59 23 - 92 mg/dL    LDL Cholesterol Calculated 170 (H) <=100 mg/dL    Non HDL Cholesterol 219 (H) <130 mg/dL    Patient Fasting > 8hrs? No     Narrative    Cholesterol  Desirable:  <200  mg/dL    Triglycerides  Normal:  Less than 150 mg/dL  Borderline High:  150-199 mg/dL  High:  200-499 mg/dL  Very High:  Greater than or equal to 500 mg/dL    Direct Measure HDL  Female:  Greater than or equal to 50 mg/dL   Male:  Greater than or equal to 40 mg/dL    LDL Cholesterol  Desirable:  <100mg/dL  Above Desirable:  100-129 mg/dL   Borderline High:  130-159 mg/dL   High:  160-189 mg/dL   Very High:  >= 190 mg/dL    Non HDL Cholesterol  Desirable:  130 mg/dL  Above Desirable:  130-159 mg/dL  Borderline High:  160-189 mg/dL  High:  190-219 mg/dL  Very High:  Greater than or equal to 220 mg/dL   Vitamin B12     Status: Normal   Result Value Ref Range    Vitamin B12 802 180 - 914 pg/mL   Folic Acid     Status: Normal   Result Value Ref Range    Folic Acid 11.2 >=5.2 ng/mL   TSH with free T4 reflex     Status: Normal   Result Value Ref Range    TSH 1.93 0.40 - 4.00 mU/L   PSA tumor marker     Status: Normal   Result Value Ref Range    PSA Tumor Marker 2.36 0.00 - 4.00 ug/L    Narrative    The DXI Access PSAS WHO assay is a two site immunoenzymatic   assay. Assay values obtained with different assay methods cannot be used   interchangeably due to differences in assay methods and reagent specificity.   CBC with platelets and differential     Status: None   Result Value Ref Range    WBC Count 8.4 4.0 - 11.0 10e3/uL    RBC Count 5.42 4.40 - 5.90 10e6/uL    Hemoglobin 16.4 13.3 - 17.7 g/dL    Hematocrit 48.3 40.0 - 53.0 %    MCV 89 78 - 100 fL    MCH 30.3 26.5 - 33.0 pg    MCHC 34.0 31.5 - 36.5 g/dL    RDW 13.7 10.0 - 15.0 %    Platelet Count 301 150 - 450 10e3/uL    % Neutrophils 52 %    % Lymphocytes 33 %    % Monocytes 8 %    % Eosinophils 5 %    % Basophils 1 %    % Immature Granulocytes 1 %    NRBCs per 100 WBC 0 <1 /100    Absolute Neutrophils 4.5 1.6 - 8.3 10e3/uL    Absolute Lymphocytes 2.8 0.8 - 5.3 10e3/uL    Absolute Monocytes 0.6 0.0 - 1.3 10e3/uL    Absolute Eosinophils 0.4 0.0 - 0.7 10e3/uL    Absolute  Basophils 0.0 0.0 - 0.2 10e3/uL    Absolute Immature Granulocytes 0.1 <=0.4 10e3/uL    Absolute NRBCs 0.0 10e3/uL   CBC and Differential     Status: None    Narrative    The following orders were created for panel order CBC and Differential.  Procedure                               Abnormality         Status                     ---------                               -----------         ------                     CBC with platelets and d...[[id number]]                      Final result                 Please view results for these tests on the individual orders.      ASSESSMENT/Plan :    Doroteo was seen today for memory loss.    Diagnoses and all orders for this visit:    Memory problem  -     Vitamin B12; Future  -     Folic Acid; Future  -     TSH with free T4 reflex; Future  -     Vitamin B12  -     Folic Acid  -     TSH with free T4 reflex  -     sertraline (ZOLOFT) 50 MG tablet; Take 1 tablet (50 mg) by mouth daily  -     MR Brain w/o & w Contrast; Future    Hyperlipidemia, unspecified hyperlipidemia type  -     Discontinue: atorvastatin (LIPITOR) 40 MG tablet; Take 1 tablet (40 mg) by mouth daily  -     Comprehensive Metabolic Panel; Future  -     Lipid Panel; Future  -     Comprehensive Metabolic Panel  -     Lipid Panel    Primary hypertension  -     Comprehensive Metabolic Panel; Future  -     CBC and Differential; Future  -     Comprehensive Metabolic Panel  -     CBC and Differential    Atherosclerosis of coronary artery of native heart without angina pectoris, unspecified vessel or lesion type  -     Comprehensive Metabolic Panel; Future  -     CBC and Differential; Future  -     Comprehensive Metabolic Panel  -     CBC and Differential    Ulcerative colitis without complications, unspecified location (H)  -     Comprehensive Metabolic Panel; Future  -     CBC and Differential; Future  -     Comprehensive Metabolic Panel  -     CBC and Differential    Prostate disorder  -     PSA tumor marker; Future  -      PSA tumor marker    Pseudodementia  -     sertraline (ZOLOFT) 50 MG tablet; Take 1 tablet (50 mg) by mouth daily    Other orders  -     [facility] IMM-  PNEUMOCOCCAL VACCINE,ADULT,SQ OR IM      Lengthy discussion today with regard to his memory issues.  Certainly he does have problem with naming.  He is having some intermittent confusion that seems to have gotten better since he stopped the Lipitor although both he and his wife are willing to restart it I think we will hold off for now.  His labs are reassuring with regard to any metabolic issues.  His B12 folate and thyroid testing is normal.  He certainly could have pseudodementia but with his family history of dementia it is a bit worrisome.  He has nothing that points me towards Parkinson's disease he has normal arm swing and no cogwheeling.  He was only able to draw the big hand on the clock and did not do the little hand.  He remembered 1 word on the mini cog.    I think we need to go ahead with MRI of the brain with and without contrast to rule out any structural issues.  Because it is fairly innocuous, I think doing an SSRI to rule out pseudodementia is certainly reasonable so we will start Zoloft 25 mg for the first couple of days to mitigate side effects and then increase to 50 mg and asked him to follow-up in a month.  If his MRI is reassuring, we discussed having him see neurology but because they go south for the winter and are not sure where they are going to be, that might be a little bit more difficult.  They might be willing to do that in the spring here when they return or could seek further care when they are down south at [facility] or someplace else.    A total of 63 minutes was spent with the patient, reviewing records, tests, ordering medications, tests or procedures and documenting clinical information in the EHR.     Yong Diaz MD            Answers for HPI/ROS submitted by the patient on 9/1/2022  If you checked off any problems, how  difficult have these problems made it for you to do your work, take care of things at home, or get along with other people?: Not difficult at all  PHQ9 TOTAL SCORE: 0  How many servings of fruits and vegetables do you eat daily?: 0-1  On average, how many sweetened beverages do you drink each day (Examples: soda, juice, sweet tea, etc.  Do NOT count diet or artificially sweetened beverages)?: 1  How many minutes a day do you exercise enough to make your heart beat faster?: 20 to 29  How many days a week do you exercise enough to make your heart beat faster?: 3 or less  How many days per week do you miss taking your medication?: 0  What is the reason for your visit today?: Memory:

## 2022-09-13 ENCOUNTER — HOSPITAL ENCOUNTER (OUTPATIENT)
Dept: MRI IMAGING | Facility: OTHER | Age: 77
Discharge: HOME | End: 2022-09-13
Attending: FAMILY MEDICINE | Admitting: FAMILY MEDICINE
Payer: COMMERCIAL

## 2022-09-13 DIAGNOSIS — R41.3 MEMORY PROBLEM: ICD-10-CM

## 2022-09-13 PROCEDURE — 70553 MRI BRAIN STEM W/O & W/DYE: CPT

## 2022-09-13 PROCEDURE — 255N000002 HC RX 255 OP 636: Performed by: FAMILY MEDICINE

## 2022-09-13 PROCEDURE — A9575 INJ GADOTERATE MEGLUMI 0.1ML: HCPCS | Performed by: FAMILY MEDICINE

## 2022-09-13 RX ADMIN — GADOTERATE MEGLUMINE 12 ML: 376.9 INJECTION INTRAVENOUS at 14:56

## 2022-09-17 ENCOUNTER — HEALTH MAINTENANCE LETTER (OUTPATIENT)
Age: 77
End: 2022-09-17

## 2022-09-30 ENCOUNTER — IMMUNIZATION (OUTPATIENT)
Dept: FAMILY MEDICINE | Facility: OTHER | Age: 77
End: 2022-09-30
Attending: FAMILY MEDICINE
Payer: COMMERCIAL

## 2022-09-30 DIAGNOSIS — Z23 HIGH PRIORITY FOR 2019-NCOV VACCINE: Primary | ICD-10-CM

## 2022-09-30 PROCEDURE — 91312 COVID-19,PF,PFIZER BOOSTER BIVALENT: CPT

## 2022-10-05 ENCOUNTER — OFFICE VISIT (OUTPATIENT)
Dept: FAMILY MEDICINE | Facility: OTHER | Age: 77
End: 2022-10-05
Attending: FAMILY MEDICINE
Payer: COMMERCIAL

## 2022-10-05 VITALS
HEART RATE: 76 BPM | SYSTOLIC BLOOD PRESSURE: 122 MMHG | BODY MASS INDEX: 23.22 KG/M2 | TEMPERATURE: 97 F | RESPIRATION RATE: 16 BRPM | DIASTOLIC BLOOD PRESSURE: 80 MMHG | OXYGEN SATURATION: 98 % | WEIGHT: 129 LBS

## 2022-10-05 DIAGNOSIS — R41.3 MEMORY PROBLEM: Primary | ICD-10-CM

## 2022-10-05 DIAGNOSIS — R41.89 PSEUDODEMENTIA: ICD-10-CM

## 2022-10-05 PROCEDURE — G0463 HOSPITAL OUTPT CLINIC VISIT: HCPCS | Performed by: FAMILY MEDICINE

## 2022-10-05 PROCEDURE — 99213 OFFICE O/P EST LOW 20 MIN: CPT | Performed by: FAMILY MEDICINE

## 2022-10-05 ASSESSMENT — PAIN SCALES - GENERAL: PAINLEVEL: MODERATE PAIN (5)

## 2022-10-05 NOTE — PROGRESS NOTES
"Nursing Notes:   Leticia Schroeder LPN  10/5/2022 11:51 AM  Signed  Chief Complaint   Patient presents with     RECHECK     After new medication       Initial /80   Pulse 76   Temp 97  F (36.1  C) (Temporal)   Resp 16   Wt 58.5 kg (129 lb)   SpO2 98%   BMI 23.22 kg/m   Estimated body mass index is 23.22 kg/m  as calculated from the following:    Height as of 9/1/22: 1.588 m (5' 2.5\").    Weight as of this encounter: 58.5 kg (129 lb).  Medication Reconciliation: complete    FOOD SECURITY SCREENING QUESTIONS  Hunger Vital Signs:  Within the past 12 months we worried whether our food would run out before we got money to buy more. Never  Within the past 12 months the food we bought just didn't last and we didn't have money to get more. Never        Advance care directive on file? yes      Leticia Schroeder LPN      SUBJECTIVE:  Doroteo Martinez  is a 77 year old male who comes in today for follow-up.  I last saw him about a month ago.  He was having some trouble with his memory.  His history at that time was as follows: \"He has been taking some delta 9. His frustration is better.  He has \"lost his nouns\" and has trouble finding a word.  He can't name things. He still drives, but his wife will help with navigation. He will forget where they are going. He tends to be overcautious with driving. He can concentrate on a project.  He will be in a conversation and will lose his train of thought. He seems to be more compulsive with certain things like unloading the . He will put things in the wrong. His mother had dementia in her 70's.\"  There was some question whether Lipitor might be playing a role and he had held it and we did not restart it.  His labs were reassuring.  There is a concern about possible pseudodementia and nothing pointing towards Parkinson's disease but he only was able to draw 1 hand on the clock and did not do the little hand and remembered only 1 word on the mini cog.  The MRI of " his brain was essentially normal with age-related changes.  We elected to start him on some Zoloft at 50 mg daily and asked him to follow-up in a month.    PHQ 5/10/2016 9/1/2022   PHQ-9 Total Score 0 0   Q9: Thoughts of better off dead/self-harm past 2 weeks Not at all Not at all     He feels like he is a little bit better.  He will have a few normal days and then will be off a bit.  His wife notes his frustration level is down when he has a difficult day.  It seems to be less severe.     They are going to their daughter's house in [place], [place] until Thanksgiving then to their son's in [place].    Past Medical, Family, and Social History reviewed and updated as noted below.   ROS is negative except as noted above       Allergies   Allergen Reactions     Aspirin Other (See Comments)     colitis   ,   Family History   Problem Relation Age of Onset     Other - See Comments Mother         Dementia     Other - See Comments Father         COPD   ,   Current Outpatient Medications   Medication     ASPIRIN NOT PRESCRIBED (INTENTIONAL)     Blood Pressure Monitor MISC     Cholecalciferol (VITAMIN D3) 50 MCG (2000 UT) CAPS     sertraline (ZOLOFT) 50 MG tablet     sod hyaluronate-sod chondroitinoitin-sod hyaluronate (DUOVISC) 0.55-0.5 ML ophthalmic kit     No current facility-administered medications for this visit.   ,   Past Medical History:   Diagnosis Date     Calculus of kidney     1970s     Hyperlipidemia     No Comments Provided     ST elevation (STEMI) myocardial infarction (H)     1997,Stents     Ulcerative colitis without complications (H)     No Comments Provided   ,   Patient Active Problem List    Diagnosis Date Noted     Colitis, ulcerative (H) 03/08/2018     Priority: Medium     Coronary atherosclerosis 03/08/2018     Priority: Medium     Diverticular disease of colon 03/08/2018     Priority: Medium     Hypertension 03/08/2018     Priority: Medium     Other and unspecified diseases of appendix 03/08/2018      Priority: Medium     ACP (advance care planning) 12/10/2013     Priority: Medium     Arthritis of right wrist 2013     Priority: Medium     Abdominal wall hernia 10/13/2011     Priority: Medium     Hyperlipidemia 2011     Priority: Medium     Fistula, intestinovesical 2011     Priority: Medium   ,   Past Surgical History:   Procedure Laterality Date     COLON SURGERY      2011,colon resection with removal of 18 inches of colon and repair of colovesicular fistula Dr. Daswon     [facility]     [facility]  2011     HEART CATH, ANGIOPLASTY      ,MI with stenting     left cataract extraction with IOL  2019    Dr. Maldonado     OTHER SURGICAL HISTORY      ,2074,SCAN-ANGIOGRAM,coronary angiography with stenting [facility]     right cataract extraction with IOL      and   Social History     Tobacco Use     Smoking status: Former Smoker     Quit date: 1976     Years since quittin.1     Smokeless tobacco: Never Used   Substance Use Topics     Alcohol use: Yes     Alcohol/week: 0.0 standard drinks     Comment: Alcoholic Drinks/day: daily wine with dinner 3-4 at times     OBJECTIVE:  /80   Pulse 76   Temp 97  F (36.1  C) (Temporal)   Resp 16   Wt 58.5 kg (129 lb)   SpO2 98%   BMI 23.22 kg/m     EXAM:  Alert cooperative, no distress.  Affect is appropriate.  He is accompanied by his wife.  No formal thought disorder.  Seems a bit more focused today.  ASSESSMENT/Plan :    Doroteo was seen today for recheck.    Diagnoses and all orders for this visit:    Memory problem    Pseudodementia      At this point I think he is doing well.  He is not having side effects from the sertraline and is seeing some positive effect.  I would plan to continue this at the current 50 mg dose for the next month and have a follow-up virtually at that time as they will be staying at her daughter's home.  Might consider increasing to 75  or 100 mg thereafter.  He has decided to give up alcohol because of his memory issues and I think he should be applauded for that and we told him that.  He will continue to abstain at this point.    A total of 25 minutes was spent with the patient, reviewing records, tests, ordering medications, tests or procedures and documenting clinical information in the EHR.     Yong Diaz MD

## 2022-10-05 NOTE — PROGRESS NOTES
Earl Sadler is a 77 year old accompanied by his spouse, presenting for the following health issues:  RECHECK (After new medication)      History of Present Illness       Reason for visit:  Recheck    He eats 2-3 servings of fruits and vegetables daily.He consumes 1 sweetened beverage(s) daily.He exercises with enough effort to increase his heart rate 30 to 60 minutes per day.  He exercises with enough effort to increase his heart rate 5 days per week.   He is taking medications regularly.             Review of Systems         Objective    /80   Pulse 76   Temp 97  F (36.1  C) (Temporal)   Resp 16   Wt 58.5 kg (129 lb)   SpO2 98%   BMI 23.22 kg/m    Body mass index is 23.22 kg/m .  Physical Exam

## 2022-10-05 NOTE — NURSING NOTE
"Chief Complaint   Patient presents with     RECHECK     After new medication       Initial /80   Pulse 76   Temp 97  F (36.1  C) (Temporal)   Resp 16   Wt 58.5 kg (129 lb)   SpO2 98%   BMI 23.22 kg/m   Estimated body mass index is 23.22 kg/m  as calculated from the following:    Height as of 9/1/22: 1.588 m (5' 2.5\").    Weight as of this encounter: 58.5 kg (129 lb).  Medication Reconciliation: complete    FOOD SECURITY SCREENING QUESTIONS  Hunger Vital Signs:  Within the past 12 months we worried whether our food would run out before we got money to buy more. Never  Within the past 12 months the food we bought just didn't last and we didn't have money to get more. Never        Advance care directive on file? yes      Leticia Schroeder LPN  "

## 2022-10-14 ENCOUNTER — ALLIED HEALTH/NURSE VISIT (OUTPATIENT)
Dept: FAMILY MEDICINE | Facility: OTHER | Age: 77
End: 2022-10-14
Attending: FAMILY MEDICINE
Payer: COMMERCIAL

## 2022-10-14 DIAGNOSIS — Z23 NEED FOR PROPHYLACTIC VACCINATION AND INOCULATION AGAINST INFLUENZA: Primary | ICD-10-CM

## 2022-10-14 PROCEDURE — G0008 ADMIN INFLUENZA VIRUS VAC: HCPCS

## 2022-11-09 ENCOUNTER — VIRTUAL VISIT (OUTPATIENT)
Dept: FAMILY MEDICINE | Facility: OTHER | Age: 77
End: 2022-11-09
Attending: FAMILY MEDICINE
Payer: COMMERCIAL

## 2022-11-09 VITALS — WEIGHT: 129 LBS | BODY MASS INDEX: 23.22 KG/M2

## 2022-11-09 DIAGNOSIS — R41.3 MEMORY PROBLEM: ICD-10-CM

## 2022-11-09 DIAGNOSIS — R41.89 PSEUDODEMENTIA: ICD-10-CM

## 2022-11-09 PROCEDURE — 99442 PR PHYSICIAN TELEPHONE EVALUATION 11-20 MIN: CPT | Performed by: FAMILY MEDICINE

## 2022-11-09 RX ORDER — SERTRALINE HYDROCHLORIDE 100 MG/1
100 TABLET, FILM COATED ORAL DAILY
Qty: 30 TABLET | Refills: 11 | Status: SHIPPED | OUTPATIENT
Start: 2022-11-09 | End: 2023-07-12

## 2022-11-09 ASSESSMENT — PATIENT HEALTH QUESTIONNAIRE - PHQ9: SUM OF ALL RESPONSES TO PHQ QUESTIONS 1-9: 0

## 2022-11-09 ASSESSMENT — PAIN SCALES - GENERAL: PAINLEVEL: NO PAIN (0)

## 2022-11-09 NOTE — PROGRESS NOTES
"Nursing Notes:   Leticia Schroeder LPN  11/9/2022 10:06 AM  Signed  Chief Complaint   Patient presents with     Recheck Medication     Zoloft       Initial Wt 58.5 kg (129 lb)   BMI 23.22 kg/m   Estimated body mass index is 23.22 kg/m  as calculated from the following:    Height as of 9/1/22: 1.588 m (5' 2.5\").    Weight as of this encounter: 58.5 kg (129 lb).  Medication Reconciliation: complete    FOOD SECURITY SCREENING QUESTIONS  Hunger Vital Signs:  Within the past 12 months we worried whether our food would run out before we got money to buy more. Never  Within the past 12 months the food we bought just didn't last and we didn't have money to get more. Never        Leticia Schroeder LPN      Doroteo is a 77 year old who is being evaluated via a billable telephone visit.      What phone number would you like to be contacted at? [phone]  How would you like to obtain your AVS? Azam Sadler was seen today for recheck medication.    Diagnoses and all orders for this visit:    Memory problem  -     sertraline (ZOLOFT) 100 MG tablet; Take 1 tablet (100 mg) by mouth daily    Pseudodementia  -     sertraline (ZOLOFT) 100 MG tablet; Take 1 tablet (100 mg) by mouth daily       Seems like he is doing reasonably well but still not optimized.  We will plan to bump his sertraline to 75 mg for 4 days to see if he tolerates it and then up to 100 mg.  Prescription sent for 100 mg dose and will plan to follow-up again in another month virtually.  He will call or return sooner if problems or questions arise before then.    Yong Diaz MD     Earl Sadler is a 77 year old accompanied by his spouse, presenting for the following health issues:  Recheck Medication (Zoloft)      JAMIR Tellez is having a virtual visit today for follow-up.  I last saw him a month ago.  We felt that he had trouble with his memory because of pseudodementia and put him on sertraline.  At the time of his follow-up visit he was feeling a " little bit better.  His wife noted that his frustration level was down when he had a difficult day and she thought his symptoms are less severe.  We kept him in 50 mg for the last month and asked him to do a virtual follow-up    PHQ 5/10/2016 9/1/2022 11/9/2022   PHQ-9 Total Score 0 0 0   Q9: Thoughts of better off dead/self-harm past 2 weeks Not at all Not at all Not at all     No flowsheet data found.    Geoff thinks he is about the same, but having less times when he forgets. His wife will spell things around their grandson and he doesn't understand the spelling unless.      Review of Systems   ROS is negative except as noted above                Objective    Vitals - Patient Reported  Pain Score: No Pain (0)        Physical Exam   healthy, alert and no distress  PSYCH: Alert and oriented times 3; coherent speech, normal   rate and volume, able to articulate logical thoughts, able   to abstract reason, no tangential thoughts, no hallucinations   or delusions  His affect is normal  RESP: No cough, no audible wheezing, able to talk in full sentences  Remainder of exam unable to be completed due to telephone visits                Phone call duration: 14 minutes

## 2022-11-09 NOTE — NURSING NOTE
"Chief Complaint   Patient presents with     Recheck Medication     Zoloft       Initial Wt 58.5 kg (129 lb)   BMI 23.22 kg/m   Estimated body mass index is 23.22 kg/m  as calculated from the following:    Height as of 9/1/22: 1.588 m (5' 2.5\").    Weight as of this encounter: 58.5 kg (129 lb).  Medication Reconciliation: complete    FOOD SECURITY SCREENING QUESTIONS  Hunger Vital Signs:  Within the past 12 months we worried whether our food would run out before we got money to buy more. Never  Within the past 12 months the food we bought just didn't last and we didn't have money to get more. Never        Leticia Schroeder LPN  "

## 2022-12-06 ENCOUNTER — VIRTUAL VISIT (OUTPATIENT)
Dept: FAMILY MEDICINE | Facility: OTHER | Age: 77
End: 2022-12-06
Attending: FAMILY MEDICINE
Payer: COMMERCIAL

## 2022-12-06 DIAGNOSIS — R41.89 PSEUDODEMENTIA: Primary | ICD-10-CM

## 2022-12-06 DIAGNOSIS — R41.3 MEMORY PROBLEM: ICD-10-CM

## 2022-12-06 PROCEDURE — 99213 OFFICE O/P EST LOW 20 MIN: CPT | Mod: 95 | Performed by: FAMILY MEDICINE

## 2022-12-06 ASSESSMENT — PATIENT HEALTH QUESTIONNAIRE - PHQ9: SUM OF ALL RESPONSES TO PHQ QUESTIONS 1-9: 0

## 2022-12-06 NOTE — PROGRESS NOTES
"Nursing Notes:   AlexandreLeticia delatorre LPN  12/6/2022 10:17 AM  Signed  Chief Complaint   Patient presents with     Recheck Medication       Initial There were no vitals taken for this visit. Estimated body mass index is 23.22 kg/m  as calculated from the following:    Height as of 9/1/22: 1.588 m (5' 2.5\").    Weight as of 11/9/22: 58.5 kg (129 lb).  Medication Reconciliation: complete    FOOD SECURITY SCREENING QUESTIONS  Hunger Vital Signs:  Within the past 12 months we worried whether our food would run out before we got money to buy more. Never  Within the past 12 months the food we bought just didn't last and we didn't have money to get more. Never        Advance care directive on file? yes      Leticia Schroeder LPN    Doroteo is a 77 year old who is being evaluated via a billable telephone visit.      What phone number would you like to be contacted at? [phone]  How would you like to obtain your AVS? Mail a copy    Doroteo was seen today for recheck medication.    Diagnoses and all orders for this visit:    Pseudodementia    Memory problem       After discussion, both the patient and his wife are in agreement that we would just stay with 100 mg of sertraline.  There is been enough improvement over the last 3 months that they are willing to see what happens in the next month.  We discussed the possibility of increasing sertraline up as he has tolerated it well.  It might help with some of his obsessive-compulsive traits as well at a higher dose but as far as memory improvements it seems that he has had significant enough improvement that they are pleased with it.  We may consider adding something like Aricept in the future but we will see how he does over the next month.    Yong Diaz MD     Subjective   Doroteo is a 77 year old accompanied by his spouse, presenting for the following health issues:  Recheck Medication      HPI Geoff is having a virtual visit today for follow-up.  I last had a virtual " visit with him a month ago and we asked him to follow-up.  We increased his sertraline to 75 mg for 4 days and if tolerated told him to go up to 100 mg and then follow-up again.    Geoff has not really noted any side effects from the increase.  He thinks he is better in conversations. He still has some trouble coming up with things at times.  Taisha seems to think that he may have had some subtle improvements. He seems to be compulsive about picking things up and moving things to put things into a certain place.  This has come on over 3 years.  Overall he is some better since September.     PHQ 9/1/2022 11/9/2022 12/6/2022   PHQ-9 Total Score 0 0 0   Q9: Thoughts of better off dead/self-harm past 2 weeks Not at all Not at all Not at all         Review of Systems   ROS is negative except as noted above           Objective    Vitals - Patient Reported  Weight (Patient Reported): 58.5 kg (129 lb)  Pain Score: No Pain (0)        Physical Exam   healthy, alert and no distress  PSYCH: Alert and oriented times 3; coherent speech, normal   rate and volume, able to articulate logical thoughts, able   to abstract reason, no tangential thoughts, no hallucinations   or delusions  His affect is normal  RESP: No cough, no audible wheezing, able to talk in full sentences  Remainder of exam unable to be completed due to telephone visits                Phone call duration: 17 minutes

## 2022-12-06 NOTE — NURSING NOTE
"Chief Complaint   Patient presents with     Recheck Medication       Initial There were no vitals taken for this visit. Estimated body mass index is 23.22 kg/m  as calculated from the following:    Height as of 9/1/22: 1.588 m (5' 2.5\").    Weight as of 11/9/22: 58.5 kg (129 lb).  Medication Reconciliation: complete    FOOD SECURITY SCREENING QUESTIONS  Hunger Vital Signs:  Within the past 12 months we worried whether our food would run out before we got money to buy more. Never  Within the past 12 months the food we bought just didn't last and we didn't have money to get more. Never        Advance care directive on file? yes      Leticia Schroeder LPN  "

## 2023-01-17 ENCOUNTER — VIRTUAL VISIT (OUTPATIENT)
Dept: FAMILY MEDICINE | Facility: OTHER | Age: 78
End: 2023-01-17
Attending: FAMILY MEDICINE
Payer: COMMERCIAL

## 2023-01-17 DIAGNOSIS — R41.89 PSEUDODEMENTIA: Primary | ICD-10-CM

## 2023-01-17 DIAGNOSIS — R41.3 MEMORY PROBLEM: ICD-10-CM

## 2023-01-17 PROCEDURE — 99213 OFFICE O/P EST LOW 20 MIN: CPT | Mod: 95 | Performed by: FAMILY MEDICINE

## 2023-01-17 NOTE — PROGRESS NOTES
Doroteo is a 77 year old who is being evaluated via a billable telephone visit.      What phone number would you like to be contacted at? [phone]  How would you like to obtain your AVS? Azam    Distant Location (provider location):  On-site        Subjective   Doroteo is a 77 year old, presenting for the following health issues:  Recheck Medication      HPI           Review of Systems         Objective    Vitals - Patient Reported  Weight (Patient Reported): 55.8 kg (123 lb)  Pain Score: No Pain (0)        Physical Exam

## 2023-01-17 NOTE — PROGRESS NOTES
"Nursing Notes:   Leticia Schroeder LPN  1/17/2023  2:01 PM  Signed  Chief Complaint   Patient presents with     Recheck Medication       Initial There were no vitals taken for this visit. Estimated body mass index is 23.22 kg/m  as calculated from the following:    Height as of 9/1/22: 1.588 m (5' 2.5\").    Weight as of 11/9/22: 58.5 kg (129 lb).  Medication Reconciliation: complete    FOOD SECURITY SCREENING QUESTIONS  Hunger Vital Signs:  Within the past 12 months we worried whether our food would run out before we got money to buy more. Never  Within the past 12 months the food we bought just didn't last and we didn't have money to get more. Never        Advance care directive on file? yes       Leticia Schroeder LPN      Doroteo is a 77 year old who is being evaluated via a billable telephone visit.      What phone number would you like to be contacted at? [phone]  How would you like to obtain your AVS? MyChart  Distant Location (provider location):  On-site    Doroteo was seen today for recheck medication.    Diagnoses and all orders for this visit:    Pseudodementia    Memory problem       Geoff seems to doing reasonably well.  We elected to continue with sertraline 100 mg daily.  We will plan to follow-up when they come back here after the winter unless something changes.  He will keep in touch with his progress.    Yong Diaz MD     Subjective   Doroteo is a 77 year old accompanied by his spouse, presenting for the following health issues:  Recheck Medication      HPI Geoff is having a telephone visit today for follow-up.  I last visited with him a month ago.  He has been on sertraline 100 mg daily and we felt that there was enough improvement over the last 3 months with his memory and OCD traits that there is merit in continuing.  We also discussed the possibility of using something like Aricept.    Geoff thinks he is doing ok.  He feels a bit unstable just in the morning.  He has some " lightheadedness. He drinks coffee primarily.  He has been trying to drink more water.     He is having a neck ache after shoveling snow. He has some episodes of memory issues, but he is still putting things in odd places.  He is worse when anxious. He is sleeping well. His appetite is normal.     They are still in [place], [place]. They go back to [place] for a few days, then [place] to 10 days then back to [place].         Review of Systems   ROS is negative except as noted above         Objective    Vitals - Patient Reported  Weight (Patient Reported): 55.8 kg (123 lb)  Pain Score: No Pain (0)      Vitals:  No vitals were obtained today due to virtual visit.    Physical Exam   healthy, alert and no distress  PSYCH: Alert and oriented times 3; coherent speech, normal   rate and volume, able to articulate logical thoughts, able   to abstract reason, no tangential thoughts, no hallucinations   or delusions  His affect is normal  RESP: No cough, no audible wheezing, able to talk in full sentences  Remainder of exam unable to be completed due to telephone visits                Phone call duration: 15 minutes

## 2023-02-24 ENCOUNTER — MYC MEDICAL ADVICE (OUTPATIENT)
Dept: FAMILY MEDICINE | Facility: OTHER | Age: 78
End: 2023-02-24
Payer: COMMERCIAL

## 2023-05-16 ENCOUNTER — OFFICE VISIT (OUTPATIENT)
Dept: FAMILY MEDICINE | Facility: OTHER | Age: 78
End: 2023-05-16
Attending: FAMILY MEDICINE
Payer: COMMERCIAL

## 2023-05-16 VITALS
HEART RATE: 85 BPM | DIASTOLIC BLOOD PRESSURE: 84 MMHG | SYSTOLIC BLOOD PRESSURE: 124 MMHG | TEMPERATURE: 97.4 F | WEIGHT: 126.8 LBS | BODY MASS INDEX: 22.82 KG/M2 | RESPIRATION RATE: 16 BRPM | OXYGEN SATURATION: 96 %

## 2023-05-16 DIAGNOSIS — H90.6 MIXED CONDUCTIVE AND SENSORINEURAL HEARING LOSS OF BOTH EARS: Primary | ICD-10-CM

## 2023-05-16 DIAGNOSIS — R41.3 MEMORY PROBLEM: ICD-10-CM

## 2023-05-16 PROCEDURE — 99214 OFFICE O/P EST MOD 30 MIN: CPT | Performed by: FAMILY MEDICINE

## 2023-05-16 PROCEDURE — G0463 HOSPITAL OUTPT CLINIC VISIT: HCPCS

## 2023-05-16 RX ORDER — DONEPEZIL HYDROCHLORIDE 5 MG/1
5 TABLET, FILM COATED ORAL AT BEDTIME
Qty: 30 TABLET | Refills: 3 | Status: SHIPPED | OUTPATIENT
Start: 2023-05-16 | End: 2023-07-12

## 2023-05-16 ASSESSMENT — PAIN SCALES - GENERAL: PAINLEVEL: MODERATE PAIN (5)

## 2023-05-16 NOTE — PROGRESS NOTES
"Nursing Notes:   Leticia Schroeder LPN  5/16/2023  4:17 PM  Signed  Chief Complaint   Patient presents with     Recheck Medication     Talk about Zoloft dosage       Initial /84   Pulse 85   Temp 97.4  F (36.3  C) (Temporal)   Resp 16   Wt 57.5 kg (126 lb 12.8 oz)   SpO2 96%   BMI 22.82 kg/m   Estimated body mass index is 22.82 kg/m  as calculated from the following:    Height as of 9/1/22: 1.588 m (5' 2.5\").    Weight as of this encounter: 57.5 kg (126 lb 12.8 oz).  Medication Reconciliation: complete    FOOD SECURITY SCREENING QUESTIONS  Hunger Vital Signs:  Within the past 12 months we worried whether our food would run out before we got money to buy more. Never  Within the past 12 months the food we bought just didn't last and we didn't have money to get more. Never        Advance care directive on file? yes      Leticia Schroeder LPN    SUBJECTIVE:  Doroteo Martinez  is a 78 year old male who comes in today for follow-up.  They are now back in their cabin.  He has been on sertraline since last fall to see if that would help with memory on the presumption that he might have pseudodementia.  He seemed to do reasonably well over the winter and we had a couple virtual visits.  He continues to have some OCD traits.    Labs last fall were unremarkable.    He and his wife agree that he has less irritability but seems to still have some trouble with forgetfulness.  His wife wonders if perhaps it might just be his hearing.  He does note some significant hearing loss.    Past Medical, Family, and Social History reviewed and updated as noted below.   ROS is negative except as noted above       Allergies   Allergen Reactions     Aspirin Other (See Comments)     colitis   ,   Family History   Problem Relation Age of Onset     Other - See Comments Mother         Dementia     Other - See Comments Father         COPD   ,   Current Outpatient Medications   Medication     ASPIRIN NOT PRESCRIBED (INTENTIONAL)     " Blood Pressure Monitor MISC     Cholecalciferol (VITAMIN D3) 50 MCG ( UT) CAPS     donepezil (ARICEPT) 5 MG tablet     sertraline (ZOLOFT) 100 MG tablet     sod hyaluronate-sod chondroitinoitin-sod hyaluronate (DUOVISC) 0.55-0.5 ML ophthalmic kit     No current facility-administered medications for this visit.   ,   Past Medical History:   Diagnosis Date     Calculus of kidney     1970s     Hyperlipidemia     No Comments Provided     ST elevation (STEMI) myocardial infarction (H)     ,Stents     Ulcerative colitis without complications (H)     No Comments Provided   ,   Patient Active Problem List    Diagnosis Date Noted     Colitis, ulcerative (H) 2018     Priority: Medium     Coronary atherosclerosis 2018     Priority: Medium     Diverticular disease of colon 2018     Priority: Medium     Hypertension 2018     Priority: Medium     Other and unspecified diseases of appendix 2018     Priority: Medium     ACP (advance care planning) 12/10/2013     Priority: Medium     Arthritis of right wrist 2013     Priority: Medium     Abdominal wall hernia 10/13/2011     Priority: Medium     Hyperlipidemia 2011     Priority: Medium     Fistula, intestinovesical 2011     Priority: Medium   ,   Past Surgical History:   Procedure Laterality Date     COLON SURGERY      2011,colon resection with removal of 18 inches of colon and repair of colovesicular fistula Dr. Dawson     [facility]     [facility]  2011     HEART CATH, ANGIOPLASTY      ,MI with stenting     left cataract extraction with IOL  2019    Dr. Maldonado     OTHER SURGICAL HISTORY      ,2074,[facility]     right cataract extraction with IOL      and   Social History     Tobacco Use     Smoking status: Former     Types: Cigarettes     Quit date: 1976     Years since quittin.7      Smokeless tobacco: Never   Vaping Use     Vaping status: Never Used   Substance Use Topics     Alcohol use: Yes     Alcohol/week: 0.0 standard drinks of alcohol     Comment: Alcoholic Drinks/day: daily wine with dinner 3-4 at times     OBJECTIVE:  /84   Pulse 85   Temp 97.4  F (36.3  C) (Temporal)   Resp 16   Wt 57.5 kg (126 lb 12.8 oz)   SpO2 96%   BMI 22.82 kg/m     EXAM:  Alert cooperative, no distress.  Accompanied by his wife.  He has clear cognition on discussion and interaction today.  ASSESSMENT/Plan :    Doroteo was seen today for recheck medication.    Diagnoses and all orders for this visit:    Mixed conductive and sensorineural hearing loss of both ears  -     Adult ENT  Referral; Future    Memory problem  -     donepezil (ARICEPT) 5 MG tablet; Take 1 tablet (5 mg) by mouth At Bedtime      At this point elected to continue Zoloft at 100 mg daily.  Trial of Aricept for a month with follow-up thereafter.    Referred to Dr. Blair for hearing evaluation.    A total of 31 minutes was spent with the patient, reviewing records, tests, ordering medications, tests or procedures and documenting clinical information in the EHR.     Yong Diaz MD          Answers for HPI/ROS submitted by the patient on 5/16/2023  What is the reason for your visit today? : memory  How many servings of fruits and vegetables do you eat daily?: 2-3  On average, how many sweetened beverages do you drink each day (Examples: soda, juice, sweet tea, etc.  Do NOT count diet or artificially sweetened beverages)?: 1  How many minutes a day do you exercise enough to make your heart beat faster?: 10 to 19  How many days a week do you exercise enough to make your heart beat faster?: 4  How many days per week do you miss taking your medication?: 0

## 2023-05-16 NOTE — NURSING NOTE
"Chief Complaint   Patient presents with     Recheck Medication     Talk about Zoloft dosage       Initial /84   Pulse 85   Temp 97.4  F (36.3  C) (Temporal)   Resp 16   Wt 57.5 kg (126 lb 12.8 oz)   SpO2 96%   BMI 22.82 kg/m   Estimated body mass index is 22.82 kg/m  as calculated from the following:    Height as of 9/1/22: 1.588 m (5' 2.5\").    Weight as of this encounter: 57.5 kg (126 lb 12.8 oz).  Medication Reconciliation: complete    FOOD SECURITY SCREENING QUESTIONS  Hunger Vital Signs:  Within the past 12 months we worried whether our food would run out before we got money to buy more. Never  Within the past 12 months the food we bought just didn't last and we didn't have money to get more. Never        Advance care directive on file? yes      Leticia Schroeder LPN  "

## 2023-05-16 NOTE — PROGRESS NOTES
Earl Sadler is a 78 year old, presenting for the following health issues:  Recheck Medication (Talk about Zoloft dosage)         View : No data to display.              History of Present Illness       Reason for visit:  Memory    He eats 2-3 servings of fruits and vegetables daily.He consumes 1 sweetened beverage(s) daily.He exercises with enough effort to increase his heart rate 10 to 19 minutes per day.  He exercises with enough effort to increase his heart rate 4 days per week.   He is taking medications regularly.               Review of Systems         Objective    /84   Pulse 85   Temp 97.4  F (36.3  C) (Temporal)   Resp 16   Wt 57.5 kg (126 lb 12.8 oz)   SpO2 96%   BMI 22.82 kg/m    Body mass index is 22.82 kg/m .  Physical Exam

## 2023-05-18 ENCOUNTER — TELEPHONE (OUTPATIENT)
Dept: FAMILY MEDICINE | Facility: OTHER | Age: 78
End: 2023-05-18
Payer: COMMERCIAL

## 2023-05-18 NOTE — TELEPHONE ENCOUNTER
DEMONDI.  Patient just wants you to know that he has an appointment Aug 1 at 1:00 with Dr Blair for hearing.      Emily Mehta on 5/18/2023 at 12:31 PM    
cephalic

## 2023-06-05 ENCOUNTER — TELEPHONE (OUTPATIENT)
Dept: FAMILY MEDICINE | Facility: OTHER | Age: 78
End: 2023-06-05
Payer: COMMERCIAL

## 2023-06-05 NOTE — TELEPHONE ENCOUNTER
I'm gone til June 26.  If he has not noted any change since adding the Aricept, the next step would be to increase to 10 mg daily.  He can refill the 5 mg tabs and take 2 a day at the same time, and follow up by mid-July.  Yong Diaz MD on 6/5/2023 at 3:07 PM

## 2023-06-05 NOTE — TELEPHONE ENCOUNTER
Patient wants a med check,but nothing avail.  They are leaving 06/23/23 and will not be back till July 3.  Can they get worked in.  Please call      Emily Mehta on 6/5/2023 at 9:01 AM

## 2023-06-06 NOTE — TELEPHONE ENCOUNTER
After verifying last name and  patient notifed of the below information.   Tamela Blanco LPN on 2023 at 3:27 PM

## 2023-07-12 ENCOUNTER — VIRTUAL VISIT (OUTPATIENT)
Dept: FAMILY MEDICINE | Facility: OTHER | Age: 78
End: 2023-07-12
Attending: FAMILY MEDICINE
Payer: COMMERCIAL

## 2023-07-12 DIAGNOSIS — R41.89 PSEUDODEMENTIA: Primary | ICD-10-CM

## 2023-07-12 DIAGNOSIS — R41.3 MEMORY PROBLEM: ICD-10-CM

## 2023-07-12 DIAGNOSIS — H90.6 MIXED CONDUCTIVE AND SENSORINEURAL HEARING LOSS OF BOTH EARS: ICD-10-CM

## 2023-07-12 PROCEDURE — 99442 PR PHYSICIAN TELEPHONE EVALUATION 11-20 MIN: CPT | Mod: 95 | Performed by: FAMILY MEDICINE

## 2023-07-12 RX ORDER — DONEPEZIL HYDROCHLORIDE 10 MG/1
10 TABLET, FILM COATED ORAL AT BEDTIME
Qty: 90 TABLET | Refills: 11 | Status: SHIPPED | OUTPATIENT
Start: 2023-07-12 | End: 2023-11-27

## 2023-07-12 RX ORDER — SERTRALINE HYDROCHLORIDE 100 MG/1
100 TABLET, FILM COATED ORAL DAILY
Qty: 90 TABLET | Refills: 11 | Status: SHIPPED | OUTPATIENT
Start: 2023-07-12 | End: 2023-11-20

## 2023-07-12 NOTE — NURSING NOTE
"Chief Complaint   Patient presents with     Recheck Medication       Initial There were no vitals taken for this visit. Estimated body mass index is 22.82 kg/m  as calculated from the following:    Height as of 9/1/22: 1.588 m (5' 2.5\").    Weight as of 5/16/23: 57.5 kg (126 lb 12.8 oz).  Medication Reconciliation: complete    FOOD SECURITY SCREENING QUESTIONS  Hunger Vital Signs:  Within the past 12 months we worried whether our food would run out before we got money to buy more. Never  Within the past 12 months the food we bought just didn't last and we didn't have money to get more. Never        Advance care directive on file? yes      Leticia Schroeder LPN  "

## 2023-08-01 ENCOUNTER — OFFICE VISIT (OUTPATIENT)
Dept: OTOLARYNGOLOGY | Facility: OTHER | Age: 78
End: 2023-08-01
Attending: OTOLARYNGOLOGY
Payer: COMMERCIAL

## 2023-08-01 DIAGNOSIS — H90.5 SENSORINEURAL HEARING LOSS (SNHL), UNSPECIFIED LATERALITY: Primary | ICD-10-CM

## 2023-08-01 PROCEDURE — G0463 HOSPITAL OUTPT CLINIC VISIT: HCPCS

## 2023-08-01 NOTE — PROGRESS NOTES
document embedded image  Patient Name: Doroteo Martinez    Address:  [address]     YOB: 1945    [place] [id number]    MR Number: [id number]    Phone: [phone]  PCP: Suresh Mederos MD            Appointment Date: 08/01/23   Visit Provider: Rico Blair MD    cc: Suresh Mederos MD; ~    ENT Progress Note  Intake  Visit Reasons: Hearing Evaluation    HPI  History of Present Illness  Chief complaint:  Hearing loss    History  The patient is a 78-year-old man who comes to the office today for evaluation of his ears and hearing.  He feels he has increasing problems hearing particularly at family gatherings.  He is had a fair amount of noise exposure to firearms in the past.  He is had no otologic surgery.  He is had no significant recurrent problems with ear infections.  He is had no head trauma.  He does have a positive family history of hearing loss in his father.  He has no history consistent with ototoxicity.     Exam  The external auditory canals and tympanic membranes are clear bilaterally  The remainder of the head and neck exam is unremarkable  Audiogram-he has a sharply sloping sensorineural hearing loss in the high frequencies.  His speech reception thresholds were 25 decibels.  He has 100% discrimination scores.    Allergies    aspirin Adverse Reaction (Verified 08/03/23 10:13)  Unknown    PFSH  PFSH:     Past Medical History: (Updated 08/03/23 @ 10:14 by Fiorella Lopez, Med Assist)    Heart trouble  Hypertension     Social History: (Updated 08/03/23 @ 10:15 by Fiorella Lopez, Med Assist)  Smoking Status:  Never smoker  second hand exposure:  No  alcohol intake:  current alcohol intake frequency: a few times a week  substance use type:  does not use       ROS  Eyes:   Reports system reviewed and no additional complaints, except as documented  ENT:   Reports nasal discharge  Cardiovascular:   Reports system reviewed and no additional complaints, except as  documented  Respiratory:   Reports system reviewed and no additional complaints, except as documented  Gastrointestinal:   Reports system reviewed and no additional complaints, except as documented  Urinary:   Reports system reviewed and no additional complaints, except as documented  Musculoskeletal:   Reports stiffness  Integumentary:   Reports unusual bruising  Neurological:   Reports memory loss  Psychiatric:   Reports system reviewed and no additional complaints, except as documented  Hematological:   Reports system reviewed and no additional complaints, except as documented  Endocrine:   Reports system reviewed and no additional complaints, except as documented  Allergic/Immunologic:   Reports system reviewed and no additional complaints, except as documented    Vital Signs      08/03/23  10:13  Weight   56.699 kg  Weight (lb)   125 lbs and  0.0  ozs  Weight Measurement Method   Stated by Patient    A&P  Assessment & Plan  (1) Sensorineural hearing loss (SNHL):        Status: Acute        Code(s):  H90.5 - Unspecified sensorineural hearing loss  The patient would be a reasonable candidate for a hearing aid trial when he wishes to move forward.  We would be happy to help him with this if he desires.               Rico Blair MD    Filed: 08/03/23 1016    <Electronically signed by Rico Blair MD> 08/04/23 0751

## 2023-09-07 ENCOUNTER — MYC MEDICAL ADVICE (OUTPATIENT)
Dept: FAMILY MEDICINE | Facility: OTHER | Age: 78
End: 2023-09-07
Payer: COMMERCIAL

## 2023-09-07 DIAGNOSIS — R41.3 MEMORY PROBLEM: Primary | ICD-10-CM

## 2023-09-08 NOTE — TELEPHONE ENCOUNTER
"When first started Aricept he believed it enhanced his abilities for a little while including short term memory and now over past few weeks has started to go back to the \"way it was before I started\" Wondering if he should have a higher dose.    Also informed that Dr. Diaz would be out of clinic until 9/13/23 and patient is ok to wait.     Spoke with Pharmacy and they said it is possible but not likely that this medication would make that difference or provide restoration of any faculties and is designed to prevent further decline. Typical dose for this medication is 10mg.     Will update MD Leticia Rajput, RN on 9/8/2023 at 4:19 PM        "

## 2023-09-13 RX ORDER — MEMANTINE HYDROCHLORIDE 5 MG-10 MG
1 KIT ORAL DAILY
Qty: 49 TABLET | Refills: 0 | Status: SHIPPED | OUTPATIENT
Start: 2023-09-13 | End: 2024-07-03

## 2023-09-13 NOTE — TELEPHONE ENCOUNTER
Not really much evidence that higher than 10 mg of Aricept will help.  Continue that and we can add Namenda, another medication, along with it to see if it will help.    Rx sent.  Yong Diaz MD on 9/13/2023 at 5:51 PM

## 2023-09-29 ENCOUNTER — MYC MEDICAL ADVICE (OUTPATIENT)
Dept: FAMILY MEDICINE | Facility: OTHER | Age: 78
End: 2023-09-29
Payer: COMMERCIAL

## 2023-11-20 ENCOUNTER — MYC MEDICAL ADVICE (OUTPATIENT)
Dept: FAMILY MEDICINE | Facility: OTHER | Age: 78
End: 2023-11-20
Payer: COMMERCIAL

## 2023-11-20 DIAGNOSIS — R41.89 PSEUDODEMENTIA: ICD-10-CM

## 2023-11-20 DIAGNOSIS — R41.3 MEMORY PROBLEM: ICD-10-CM

## 2023-11-20 NOTE — TELEPHONE ENCOUNTER
Requested Prescriptions   Pending Prescriptions Disp Refills    sertraline (ZOLOFT) 100 MG tablet 90 tablet 11     Sig: Take 1 tablet (100 mg) by mouth daily       There is no refill protocol information for this order          Last Prescription Date:   7/12/23  Last Fill Qty/Refills:         90, R-4    Last Office Visit:              8/1/23   Future Office visit:           none

## 2023-11-21 RX ORDER — SERTRALINE HYDROCHLORIDE 100 MG/1
100 TABLET, FILM COATED ORAL DAILY
Qty: 90 TABLET | Refills: 11 | Status: SHIPPED | OUTPATIENT
Start: 2023-11-21

## 2023-11-27 NOTE — TELEPHONE ENCOUNTER
Requested Prescriptions   Pending Prescriptions Disp Refills    donepezil (ARICEPT) 10 MG tablet 90 tablet 11     Sig: Take 1 tablet (10 mg) by mouth at bedtime       There is no refill protocol information for this order     Last Prescription Date:   7/12/23  Last Fill Qty/Refills:         90, R-11    Last Office Visit:              8/1/23   Future Office visit:           none    Routing to provider.    El Calvo RN on 11/27/2023 at 4:16 PM

## 2023-11-28 NOTE — TELEPHONE ENCOUNTER
PCP is here this afternoon. Please route to him for review.   Saskia Falcon PA-C on 11/28/2023 at 7:28 AM

## 2023-12-04 RX ORDER — DONEPEZIL HYDROCHLORIDE 10 MG/1
10 TABLET, FILM COATED ORAL AT BEDTIME
Qty: 90 TABLET | Refills: 11 | Status: SHIPPED | OUTPATIENT
Start: 2023-12-04

## 2024-06-29 ASSESSMENT — SOCIAL DETERMINANTS OF HEALTH (SDOH): HOW OFTEN DO YOU GET TOGETHER WITH FRIENDS OR RELATIVES?: ONCE A WEEK

## 2024-07-01 ENCOUNTER — OFFICE VISIT (OUTPATIENT)
Dept: FAMILY MEDICINE | Facility: OTHER | Age: 79
End: 2024-07-01
Attending: STUDENT IN AN ORGANIZED HEALTH CARE EDUCATION/TRAINING PROGRAM
Payer: COMMERCIAL

## 2024-07-01 VITALS
SYSTOLIC BLOOD PRESSURE: 152 MMHG | HEIGHT: 63 IN | HEART RATE: 56 BPM | OXYGEN SATURATION: 99 % | BODY MASS INDEX: 21.37 KG/M2 | WEIGHT: 120.6 LBS | TEMPERATURE: 96.9 F | RESPIRATION RATE: 16 BRPM | DIASTOLIC BLOOD PRESSURE: 73 MMHG

## 2024-07-01 DIAGNOSIS — I25.10 ATHEROSCLEROSIS OF CORONARY ARTERY OF NATIVE HEART WITHOUT ANGINA PECTORIS, UNSPECIFIED VESSEL OR LESION TYPE: ICD-10-CM

## 2024-07-01 DIAGNOSIS — H90.5 SENSORINEURAL HEARING LOSS (SNHL), UNSPECIFIED LATERALITY: ICD-10-CM

## 2024-07-01 DIAGNOSIS — F03.94 DEMENTIA WITH ANXIETY, UNSPECIFIED DEMENTIA SEVERITY, UNSPECIFIED DEMENTIA TYPE (H): ICD-10-CM

## 2024-07-01 DIAGNOSIS — I10 PRIMARY HYPERTENSION: ICD-10-CM

## 2024-07-01 DIAGNOSIS — H10.9 CONJUNCTIVITIS OF BOTH EYES, UNSPECIFIED CONJUNCTIVITIS TYPE: ICD-10-CM

## 2024-07-01 DIAGNOSIS — E78.2 MIXED HYPERLIPIDEMIA: ICD-10-CM

## 2024-07-01 DIAGNOSIS — I25.2 HISTORY OF ST ELEVATION MYOCARDIAL INFARCTION (STEMI): ICD-10-CM

## 2024-07-01 DIAGNOSIS — K51.90 ULCERATIVE COLITIS WITHOUT COMPLICATIONS, UNSPECIFIED LOCATION (H): ICD-10-CM

## 2024-07-01 DIAGNOSIS — Z00.00 MEDICARE ANNUAL WELLNESS VISIT, SUBSEQUENT: Primary | ICD-10-CM

## 2024-07-01 PROCEDURE — G0439 PPPS, SUBSEQ VISIT: HCPCS | Performed by: STUDENT IN AN ORGANIZED HEALTH CARE EDUCATION/TRAINING PROGRAM

## 2024-07-01 PROCEDURE — 99214 OFFICE O/P EST MOD 30 MIN: CPT | Mod: 25 | Performed by: STUDENT IN AN ORGANIZED HEALTH CARE EDUCATION/TRAINING PROGRAM

## 2024-07-01 PROCEDURE — G0463 HOSPITAL OUTPT CLINIC VISIT: HCPCS

## 2024-07-01 ASSESSMENT — PAIN SCALES - GENERAL: PAINLEVEL: NO PAIN (0)

## 2024-07-01 NOTE — PROGRESS NOTES
Preventive Care Visit  [facility] AND \[facility]\""rodrick Jaeger DO, Family Medicine  Jul 1, 2024      Assessment & Plan     (Z00.00) Medicare annual wellness visit, subsequent  (primary encounter diagnosis)  Comment: discussed primary and secondary medicine. Declines vaccines at this time. Concerned about dementia getting worse    (F03.94) Dementia with anxiety, unspecified dementia severity, unspecified dementia type (H)  Comment: progressing, likely unable to compensate anymore. Continue aricept and zoloft - no refills needed at this time. Discussed trial rexulti as adjunct    (I10) Primary hypertension  Comment: improved some after being allowed to calm. Not prescribed any BP meds at this time. Monitor. consider class that will not slow HR.    (E78.2) Mixed hyperlipidemia  Comment: decline statin. recommend fish oil 2000mg/day    (I25.10) Atherosclerosis of coronary artery of native heart without angina pectoris, unspecified vessel or lesion type  Comment: 3 stents    (K51.90) Ulcerative colitis without complications, unspecified location (H)  Comment: s/p colon resection. Doing well    (H90.5) Sensorineural hearing loss (SNHL), unspecified laterality  Comment: 8/1/23 Audiogram-sharply sloping sensorineural hearing loss in the high frequencies.  His speech reception thresholds were 25db; 100% discrimination scores.    (I25.2) History of ST elevation myocardial infarction (STEMI)  Comment: 1997, required 3 stents.    (H10.9) Conjunctivitis of both eyes, unspecified conjunctivitis type  Comment: L>R, following with ophtho; taking steroid drops    (Z68.21) Body mass index (BMI) 21.0-21.9, adult  Comment: maintain weight with healthy diet and regular activity           - remote hx of ACS with 3 stints but statins previously discontinued by PCP d/t possible risk of dementia/memory issues. I agree with patient's decision to not be on a statin despite mixed HLD on 9/1/22 due to mixed results from studies on  "benefit vs risk for elderly patients. However, I recommend fish oil and maybe niacin d/t HLD with hx of ACS. Discussed daily low dose aspirin for vascular health in brain as well as heart but with increased risk of bleeding. Corrected belief that he has an allergy to aspirin causing his hx of colitis but likely experienced a side effect causing gastritis and worsening colitis. Declines at this time.  Discussed MRI brain in 2022 - nonspecific findings; offered Alzheimer's lab test but explained a positive in a anyone older than 65 is essentially 50/50 false/true but a negative is likely true- he declined.  Discussed importance of hearing to prevent/slow progression of dementia. They were under impression from audiologist that he did not need hearing aid so I showed them the note by Dr. Blair - \"Unspecified sensorineural hearing loss : The patient would be a reasonable candidate for a hearing aid trial when he wishes to move forward. We would be happy to help him with this if he desires. \"    Counseling  Appropriate preventive services were discussed with this patient, including applicable screening as appropriate for fall prevention, nutrition, physical activity, Tobacco-use cessation, weight loss and cognition.  Checklist reviewing preventive services available has been given to the patient.  Reviewed patient's diet, addressing concerns and/or questions.   The patient was instructed to see the dentist every 6 months.   Updated plan of care.  Patient reported difficulty with activities of daily living were addressed today.Addressed any concerns about safety while driving.  The patient was provided with written information regarding signs of hearing loss.       Patient wishes to follow up after guests/holidays and talking to audiologist but before leaving for winter.    Return in about 2 months (around 9/1/2024) for dementia, anxiety, hearing, BP, with me.    Earl Sadler is a 79 year old, presenting for the " following:  Physical        7/1/2024    10:14 AM   Additional Questions   Roomed by Sheldon COON         Health Care Directive  Patient has a Health Care Directive on file  Wife is considering updating to specifically address his capacity with dementia, but she would still be his proxy. Plans to bring to follow up if changes/updates.    HPI  79yom with memory issues presents for Annual wellness.  History of ulcerative colitis which has been well controlled;  status post laparoscopic low anterior resection with splenic flexure mobilization and repair of colovesical fistula in 2011. History of MI with PCI and 3 stents back in the 1997 but has been well controlled and asymptomatic for years. No longer taking lipitor or aspirin. Remains quit from smoking. Has wine with dinner. No hearing aids. Takes rx Zoloft, aricept, vit D3 regularly. Wife reports he stabilized on the aricept after about a month and have not seen much improvement since; Nemenda reportedly worked better but insurance stopped covering. Zoloft has helped with anxiety and other mental health; saw improvement in memory after starting. He continues to have some OCD traits (compulsive with certain things like unloading the ). He and his wife agree that he has less irritability and no longer mumbles but seems to still have some trouble with forgetfulness; progressively struggles to find words more especially when anxious. No longer drives as he feels he cannot respond quick enough anymore; Wife drives but fatigues quickly when driving RV so considering house-sitting or renting this winter but may travel even if just to be with kids.          6/29/2024   General Health   How would you rate your overall physical health? Good   Feel stress (tense, anxious, or unable to sleep) To some extent      (!) STRESS CONCERN      6/29/2024   Nutrition   Diet: Regular (no restrictions)             No data to display                  6/29/2024   Social Factors    Frequency of gathering with friends or relatives Once a week   Worry food won't last until get money to buy more No   Food not last or not have enough money for food? No   Do you have housing? (Housing is defined as stable permanent housing and does not include staying ouside in a car, in a tent, in an abandoned building, in an overnight shelter, or couch-surfing.) Yes   Are you worried about losing your housing? No   Lack of transportation? No   Unable to get utilities (heat,electricity)? No            6/29/2024   Fall Risk   Fallen 2 or more times in the past year? No    No   Trouble with walking or balance? No    No       Multiple values from one day are sorted in reverse-chronological order          6/29/2024   Activities of Daily Living- Home Safety   Needs help with the following daily activites Transportation    Medication administration    Money management   Safety concerns in the home None of the above       Multiple values from one day are sorted in reverse-chronological order         6/29/2024   Dental   Dentist two times every year? (!) NO            6/29/2024   Hearing Screening   Hearing concerns? (!) IT'S HARD TO FOLLOW A CONVERSATION IN A NOISY RESTAURANT OR CROWDED ROOM.    (!) TROUBLE UNDERSTANDING SOFT OR WHISPERED SPEECH.    (!) TROUBLE UNDERSTANDING SPEECH ON THE TELEPHONE       Multiple values from one day are sorted in reverse-chronological order         6/29/2024   Driving Risk Screening   Patient/family members have concerns about driving (!) YES - patient no longer drives.            6/29/2024   General Alertness/Fatigue Screening   Have you been more tired than usual lately? (!) DECLINE            6/29/2024   Urinary Incontinence Screening   Bothered by leaking urine in past 6 months No            6/29/2024   TB Screening   Were you born outside of the [place]? No            Today's PHQ-2 Score:       7/1/2024    10:09 AM   PHQ-2 ( 1999 [employer])   Q1: Little interest or pleasure in doing  things 0   Q2: Feeling down, depressed or hopeless 0   PHQ-2 Score 0   Q1: Little interest or pleasure in doing things Not at all   Q2: Feeling down, depressed or hopeless Not at all   PHQ-2 Score 0           2024   Substance Use   Alcohol more than 3/day or more than 7/wk No   Do you have a current opioid prescription? No   How severe/bad is pain from 1 to 10? 5/10   Do you use any other substances recreationally? (!) DECLINE        Social History     Tobacco Use    Smoking status: Former     Current packs/day: 0.00     Types: Cigarettes     Quit date: 1976     Years since quittin.8     Passive exposure: Past    Smokeless tobacco: Never   Vaping Use    Vaping status: Never Used   Substance Use Topics    Alcohol use: Yes     Alcohol/week: 0.0 standard drinks of alcohol     Comment: Alcoholic Drinks/day: daily wine with dinner 3-4 at times    Drug use: No     Comment: Drug use: No       ASCVD Risk   The ASCVD Risk score (Bronson LINDA, et al., 2019) failed to calculate for the following reasons:    The patient has a prior MI or stroke diagnosis    Fracture Risk Assessment Tool  Link to Frax Calculator  Use the information below to complete the Frax calculator  : 1945  Sex: male  Weight (kg): 54.7 kg (actual weight)  Height (cm): 160.7 cm  Previous Fragility Fracture:  No  History of parent with fractured hip:  No  Current Smoking:  No  Patient has been on glucocorticoids for more than 3 months (5mg/day or more): No  Rheumatoid Arthritis on Problem List:  No  Secondary Osteoporosis on Problem List:  No  Consumes 3 or more units of alcohol per day: No  Femoral Neck BMD (g/cm2)        2024   FRAX Calculated Score- 10yr Fracture Probability (%)   Major Osteoporotic- without BMD 9.4 %   Hip Fracture- without BMD 5 %            Reviewed and updated as needed this visit by Provider   Tobacco     Med Hx  Surg Hx  Fam Hx  Soc Hx Sexual Activity          Past Medical History:   Diagnosis  Date    Calculus of kidney     1970s    Hyperlipidemia     No Comments Provided    ST elevation (STEMI) myocardial infarction (H)     1997,Stents    Ulcerative colitis without complications (H)     No Comments Provided     Past Surgical History:   Procedure Laterality Date    COLON SURGERY      4/2011,colon resection with removal of 18 inches of colon and repair of colovesicular fistula Dr. Dawson    COLONOSCOPY  06/01/2012 6/12,with biopsy, Poppy Oneal    COLONOSCOPY  04/01/2011    HEART CATH, ANGIOPLASTY      1997,MI with stenting    left cataract extraction with IOL  07/27/2019    Dr. Maldonado    OTHER SURGICAL HISTORY      1997,[id number],SCAN-ANGIOGRAM,coronary angiography with stenting [facility]    right cataract extraction with IOL  2015     Labs reviewed in EPIC  BP Readings from Last 3 Encounters:   07/01/24 (!) 152/73   05/16/23 124/84   10/05/22 122/80    Wt Readings from Last 3 Encounters:   07/01/24 54.7 kg (120 lb 9.6 oz)   05/16/23 57.5 kg (126 lb 12.8 oz)   11/09/22 58.5 kg (129 lb)                  Patient Active Problem List   Diagnosis    Abdominal wall hernia    ACP (advance care planning)    Arthritis of right wrist    Colitis, ulcerative (H)    Coronary atherosclerosis    Diverticular disease of colon    Fistula, intestinovesical    Hyperlipidemia    Hypertension    ST elevation (STEMI) myocardial infarction (H)     Past Surgical History:   Procedure Laterality Date    COLON SURGERY      4/2011,colon resection with removal of 18 inches of colon and repair of colovesicular fistula Dr. Dawson    COLONOSCOPY  06/01/2012 6/12,with biopsy, Poppy Oneal    COLONOSCOPY  04/01/2011    HEART CATH, ANGIOPLASTY      1997,MI with stenting    left cataract extraction with IOL  07/27/2019    Dr. Maldonado    OTHER SURGICAL HISTORY      1997,[id number],SCAN-ANGIOGRAM,coronary angiography with stenting [facility]    right cataract extraction with IOL  2015       Social History     Tobacco Use    Smoking  status: Former     Current packs/day: 0.00     Types: Cigarettes     Quit date: 1976     Years since quittin.8     Passive exposure: Past    Smokeless tobacco: Never   Substance Use Topics    Alcohol use: Yes     Alcohol/week: 0.0 standard drinks of alcohol     Comment: Alcoholic Drinks/day: daily wine with dinner 3-4 at times     Family History   Problem Relation Age of Onset    Other - See Comments Mother         Dementia    Other - See Comments Father         COPD         Current Outpatient Medications   Medication Sig Dispense Refill    Blood Pressure Monitor MISC As directed.      Cholecalciferol (VITAMIN D3) 50 MCG ( UT) CAPS       donepezil (ARICEPT) 10 MG tablet Take 1 tablet (10 mg) by mouth at bedtime 90 tablet 11    prednisoLONE acetate (PRED FORTE) 1 % ophthalmic suspension Place 1 drop Into the left eye every hour      sertraline (ZOLOFT) 100 MG tablet Take 1 tablet (100 mg) by mouth daily 90 tablet 11    ASPIRIN NOT PRESCRIBED (INTENTIONAL) Please choose reason not prescribed, below (Patient not taking: Reported on 2023)       Allergies   Allergen Reactions    Aspirin Other (See Comments)     colitis     Recent Labs   Lab Test 22  1323 20  1051 19  1537   * 103* 49   HDL 59 63 41   TRIG 247* 129 312*   ALT 13 16 26   CR 0.99 0.96 0.90   GFRESTIMATED 79 77 83   GFRESTBLACK  --  >90 >90   POTASSIUM 4.0 4.5 3.7   TSH 1.93  --   --       Current providers sharing in care for this patient include:  Patient Care Team:  No Ref-Primary, Physician as PCP - General  Yong Diaz MD as Assigned PCP  Rico Blair MD as Assigned Surgical Provider    The following health maintenance items are reviewed in Epic and correct as of today:  Health Maintenance   Topic Date Due    ZOSTER IMMUNIZATION (2 of 3) 2009    Pneumococcal Vaccine: 65+ Years (2 of 2 - PCV) 2013    COVID-19 Vaccine (3 - -24 season) 2024 (Originally 2023)    DTAP/TDAP/TD  "IMMUNIZATION (2 - Td or Tdap) 08/26/2024    INFLUENZA VACCINE (1) 09/01/2024    MEDICARE ANNUAL WELLNESS VISIT  07/01/2025    FALL RISK ASSESSMENT  07/01/2025    GLUCOSE  09/01/2025    ADVANCE CARE PLANNING  07/01/2029    PHQ-2 (once per calendar year)  Completed    RSV VACCINE (Pregnancy & 60+)  Completed    IPV IMMUNIZATION  Aged Out    HPV IMMUNIZATION  Aged Out    MENINGITIS IMMUNIZATION  Aged Out    RSV MONOCLONAL ANTIBODY  Aged Out    LIPID  Discontinued    HEPATITIS C SCREENING  Discontinued    COLORECTAL CANCER SCREENING  Discontinued       Review of Systems   Constitutional: Negative.    HENT: Negative.     Eyes:  Positive for redness.   Respiratory: Negative.     Cardiovascular: Negative.    Gastrointestinal: Negative.    Musculoskeletal: Negative.    Skin: Negative.    Allergic/Immunologic: Negative.    Neurological:  Positive for speech difficulty (hard to find words). Negative for tremors, seizures and light-headedness.   Hematological: Negative.    Psychiatric/Behavioral:  Positive for confusion and decreased concentration. Negative for agitation, behavioral problems, dysphoric mood, hallucinations and sleep disturbance. The patient is nervous/anxious.            Objective    Exam  BP (!) 152/73 (BP Location: Right arm, Patient Position: Sitting, Cuff Size: Adult Regular)   Pulse 56   Temp 96.9  F (36.1  C) (Temporal)   Resp 16   Ht 1.607 m (5' 3.25\")   Wt 54.7 kg (120 lb 9.6 oz)   SpO2 99%   BMI 21.19 kg/m     Estimated body mass index is 21.19 kg/m  as calculated from the following:    Height as of this encounter: 1.607 m (5' 3.25\").    Weight as of this encounter: 54.7 kg (120 lb 9.6 oz).    Physical Exam  General Appearance: Pleasant, alert, appropriate appearance for age. No acute distress  Head Exam: Normocephalic, atraumatic.  Eye Exam:  Normal external eyes and lids; bilateral conjuctivitis. SHANDRA. EOMI  Ear Exam: Normal TM's bilaterally. Normal auditory canals and external ears. " Non-tender. Hard of hearing  Nose Exam: Normal external nose  OroPharynx Exam:  Dental hygiene adequate. Normal buccal mucosa. Normal oropharynx.  Neck Exam:  Supple, no masses or nodes. No audible bruits  Thyroid Exam: No nodules or enlargement.  Respiratory Exam: Normal chest wall movement, nontender. Clear to auscultation bilaterally; normal effort.  Cardiovascular Exam: Regular rate and rhythm. S1, S2, no murmur, click, gallop, or rubs.  Gastrointestinal Exam: Soft, non-tender, no masses or organomegaly. Bs present  Lymphatic Exam: Non-palpable nodes in neck, clavicular regions.  Musculoskeletal Exam: Back is non-tender, no rigidity or cogwheeling. good pedal pulses. No LE edema  Skin: warm, dry; no rash visible but lesion resembling seborrheic dermatitis on right temple near eye  Neurologic Exam: Nonfocal, normal gross motor, tone, and coordination; no intentional or at-rest tremor.  He has grossly normal gait but with slightly widened base.  He has no cerebellar findings on exam.  Speech is coherent but struggles to find words. He cannot name a pen or a paperclip but did describe what they were for.  Psychiatric Exam: Anxious.  No formal thought disorder        7/1/2024   Mini Cog   Clock Draw Score 0 Abnormal   3 Item Recall 0 objects recalled   Mini Cog Total Score 0                     Signed Electronically by: Bryan Jaeger DO

## 2024-07-01 NOTE — NURSING NOTE
"Chief Complaint   Patient presents with    Physical       Initial BP (!) 152/73 (BP Location: Right arm, Patient Position: Sitting, Cuff Size: Adult Regular)   Pulse 56   Temp 96.9  F (36.1  C) (Temporal)   Resp 16   Ht 1.607 m (5' 3.25\")   Wt 54.7 kg (120 lb 9.6 oz)   SpO2 99%   BMI 21.19 kg/m   Estimated body mass index is 21.19 kg/m  as calculated from the following:    Height as of this encounter: 1.607 m (5' 3.25\").    Weight as of this encounter: 54.7 kg (120 lb 9.6 oz).  Medication Review: complete    The next two questions are to help us understand your food security.  If you are feeling you need any assistance in this area, we have resources available to support you today.          6/29/2024   SDOH- Food Insecurity   Within the past 12 months, did you worry that your food would run out before you got money to buy more? N   Within the past 12 months, did the food you bought just not last and you didn t have money to get more? N            Health Care Directive:  Patient does not have a Health Care Directive or Living Will: Patient states has Advance Directive and will bring in a copy to clinic.    Shledon Maradiaga      "

## 2024-07-07 PROBLEM — K38.9 OTHER AND UNSPECIFIED DISEASES OF APPENDIX: Status: RESOLVED | Noted: 2018-03-08 | Resolved: 2024-07-07

## 2024-07-07 RX ORDER — PREDNISOLONE ACETATE 10 MG/ML
1 SUSPENSION/ DROPS OPHTHALMIC
COMMUNITY
Start: 2024-06-26

## 2024-07-07 ASSESSMENT — ENCOUNTER SYMPTOMS
SEIZURES: 0
RESPIRATORY NEGATIVE: 1
SPEECH DIFFICULTY: 1
TREMORS: 0
GASTROINTESTINAL NEGATIVE: 1
CONSTITUTIONAL NEGATIVE: 1
LIGHT-HEADEDNESS: 0
DECREASED CONCENTRATION: 1
HEMATOLOGIC/LYMPHATIC NEGATIVE: 1
NERVOUS/ANXIOUS: 1
CONFUSION: 1
DYSPHORIC MOOD: 0
EYE REDNESS: 1
MUSCULOSKELETAL NEGATIVE: 1
AGITATION: 0
CARDIOVASCULAR NEGATIVE: 1
SLEEP DISTURBANCE: 0
HALLUCINATIONS: 0
ALLERGIC/IMMUNOLOGIC NEGATIVE: 1

## 2024-07-21 ENCOUNTER — MYC MEDICAL ADVICE (OUTPATIENT)
Dept: FAMILY MEDICINE | Facility: OTHER | Age: 79
End: 2024-07-21
Payer: COMMERCIAL

## 2024-07-21 DIAGNOSIS — F03.94 DEMENTIA WITH ANXIETY, UNSPECIFIED DEMENTIA SEVERITY, UNSPECIFIED DEMENTIA TYPE (H): Primary | ICD-10-CM

## 2024-08-09 ENCOUNTER — MYC MEDICAL ADVICE (OUTPATIENT)
Dept: FAMILY MEDICINE | Facility: OTHER | Age: 79
End: 2024-08-09
Payer: COMMERCIAL

## 2024-08-09 NOTE — TELEPHONE ENCOUNTER
Sent teams message to Alondra to send Neurology referral to Meri to see patient at [facility].     Waiting to hear back.     El Calvo RN on 8/9/2024 at 4:51 PM

## 2024-09-10 ENCOUNTER — TRANSFERRED RECORDS (OUTPATIENT)
Dept: HEALTH INFORMATION MANAGEMENT | Facility: OTHER | Age: 79
End: 2024-09-10
Payer: COMMERCIAL

## 2024-09-30 ENCOUNTER — OFFICE VISIT (OUTPATIENT)
Dept: FAMILY MEDICINE | Facility: OTHER | Age: 79
End: 2024-09-30
Attending: STUDENT IN AN ORGANIZED HEALTH CARE EDUCATION/TRAINING PROGRAM
Payer: COMMERCIAL

## 2024-09-30 VITALS
OXYGEN SATURATION: 98 % | TEMPERATURE: 97.6 F | DIASTOLIC BLOOD PRESSURE: 62 MMHG | SYSTOLIC BLOOD PRESSURE: 130 MMHG | HEART RATE: 63 BPM | HEIGHT: 63 IN | RESPIRATION RATE: 18 BRPM | WEIGHT: 124.8 LBS | BODY MASS INDEX: 22.11 KG/M2

## 2024-09-30 DIAGNOSIS — H20.9 IRITIS OF BOTH EYES: Primary | ICD-10-CM

## 2024-09-30 DIAGNOSIS — I10 PRIMARY HYPERTENSION: ICD-10-CM

## 2024-09-30 DIAGNOSIS — D84.9 IMMUNODEFICIENCY (H): ICD-10-CM

## 2024-09-30 DIAGNOSIS — E78.2 MIXED HYPERLIPIDEMIA: ICD-10-CM

## 2024-09-30 DIAGNOSIS — E55.9 VITAMIN D DEFICIENCY: ICD-10-CM

## 2024-09-30 DIAGNOSIS — F03.94 DEMENTIA WITH ANXIETY, UNSPECIFIED DEMENTIA SEVERITY, UNSPECIFIED DEMENTIA TYPE (H): ICD-10-CM

## 2024-09-30 DIAGNOSIS — I25.10 ATHEROSCLEROSIS OF CORONARY ARTERY OF NATIVE HEART WITHOUT ANGINA PECTORIS, UNSPECIFIED VESSEL OR LESION TYPE: ICD-10-CM

## 2024-09-30 DIAGNOSIS — B99.9 RECURRENT INFECTIONS: ICD-10-CM

## 2024-09-30 LAB
ALBUMIN SERPL BCG-MCNC: 4.3 G/DL (ref 3.5–5.2)
ALP SERPL-CCNC: 79 U/L (ref 40–150)
ALT SERPL W P-5'-P-CCNC: 18 U/L (ref 0–70)
ANION GAP SERPL CALCULATED.3IONS-SCNC: 9 MMOL/L (ref 7–15)
AST SERPL W P-5'-P-CCNC: 23 U/L (ref 0–45)
BASOPHILS # BLD AUTO: 0.1 10E3/UL (ref 0–0.2)
BASOPHILS NFR BLD AUTO: 1 %
BILIRUB SERPL-MCNC: 0.2 MG/DL
BUN SERPL-MCNC: 26 MG/DL (ref 8–23)
CALCIUM SERPL-MCNC: 9.8 MG/DL (ref 8.8–10.4)
CHLORIDE SERPL-SCNC: 104 MMOL/L (ref 98–107)
CHOLEST SERPL-MCNC: 259 MG/DL
CREAT SERPL-MCNC: 1.02 MG/DL (ref 0.67–1.17)
CRP SERPL-MCNC: 3.21 MG/L
EGFRCR SERPLBLD CKD-EPI 2021: 75 ML/MIN/1.73M2
EOSINOPHIL # BLD AUTO: 0.4 10E3/UL (ref 0–0.7)
EOSINOPHIL NFR BLD AUTO: 4 %
ERYTHROCYTE [DISTWIDTH] IN BLOOD BY AUTOMATED COUNT: 13.9 % (ref 10–15)
ERYTHROCYTE [SEDIMENTATION RATE] IN BLOOD BY WESTERGREN METHOD: 3 MM/HR (ref 0–20)
FASTING STATUS PATIENT QL REPORTED: NO
FASTING STATUS PATIENT QL REPORTED: NO
GLUCOSE SERPL-MCNC: 77 MG/DL (ref 70–99)
HCO3 SERPL-SCNC: 29 MMOL/L (ref 22–29)
HCT VFR BLD AUTO: 49.7 % (ref 40–53)
HDLC SERPL-MCNC: 57 MG/DL
HGB BLD-MCNC: 16 G/DL (ref 13.3–17.7)
IMM GRANULOCYTES # BLD: 0 10E3/UL
IMM GRANULOCYTES NFR BLD: 0 %
LDLC SERPL CALC-MCNC: 141 MG/DL
LYMPHOCYTES # BLD AUTO: 2.4 10E3/UL (ref 0.8–5.3)
LYMPHOCYTES NFR BLD AUTO: 24 %
MCH RBC QN AUTO: 30.2 PG (ref 26.5–33)
MCHC RBC AUTO-ENTMCNC: 32.2 G/DL (ref 31.5–36.5)
MCV RBC AUTO: 94 FL (ref 78–100)
MONOCYTES # BLD AUTO: 0.6 10E3/UL (ref 0–1.3)
MONOCYTES NFR BLD AUTO: 6 %
NEUTROPHILS # BLD AUTO: 6.4 10E3/UL (ref 1.6–8.3)
NEUTROPHILS NFR BLD AUTO: 65 %
NONHDLC SERPL-MCNC: 202 MG/DL
NRBC # BLD AUTO: 0 10E3/UL
NRBC BLD AUTO-RTO: 0 /100
PLATELET # BLD AUTO: 305 10E3/UL (ref 150–450)
POTASSIUM SERPL-SCNC: 4.5 MMOL/L (ref 3.4–5.3)
PROT SERPL-MCNC: 7.4 G/DL (ref 6.4–8.3)
RBC # BLD AUTO: 5.29 10E6/UL (ref 4.4–5.9)
SODIUM SERPL-SCNC: 142 MMOL/L (ref 135–145)
TRIGL SERPL-MCNC: 303 MG/DL
WBC # BLD AUTO: 10 10E3/UL (ref 4–11)

## 2024-09-30 PROCEDURE — 86481 TB AG RESPONSE T-CELL SUSP: CPT | Mod: ZL | Performed by: STUDENT IN AN ORGANIZED HEALTH CARE EDUCATION/TRAINING PROGRAM

## 2024-09-30 PROCEDURE — 86160 COMPLEMENT ANTIGEN: CPT | Mod: ZL | Performed by: STUDENT IN AN ORGANIZED HEALTH CARE EDUCATION/TRAINING PROGRAM

## 2024-09-30 PROCEDURE — 86618 LYME DISEASE ANTIBODY: CPT | Mod: ZL | Performed by: STUDENT IN AN ORGANIZED HEALTH CARE EDUCATION/TRAINING PROGRAM

## 2024-09-30 PROCEDURE — G2211 COMPLEX E/M VISIT ADD ON: HCPCS | Performed by: STUDENT IN AN ORGANIZED HEALTH CARE EDUCATION/TRAINING PROGRAM

## 2024-09-30 PROCEDURE — 86038 ANTINUCLEAR ANTIBODIES: CPT | Mod: ZL | Performed by: STUDENT IN AN ORGANIZED HEALTH CARE EDUCATION/TRAINING PROGRAM

## 2024-09-30 PROCEDURE — 86431 RHEUMATOID FACTOR QUANT: CPT | Mod: ZL | Performed by: STUDENT IN AN ORGANIZED HEALTH CARE EDUCATION/TRAINING PROGRAM

## 2024-09-30 PROCEDURE — 86140 C-REACTIVE PROTEIN: CPT | Mod: ZL | Performed by: STUDENT IN AN ORGANIZED HEALTH CARE EDUCATION/TRAINING PROGRAM

## 2024-09-30 PROCEDURE — 86612 BLASTOMYCES ANTIBODY: CPT | Mod: ZL | Performed by: STUDENT IN AN ORGANIZED HEALTH CARE EDUCATION/TRAINING PROGRAM

## 2024-09-30 PROCEDURE — 99214 OFFICE O/P EST MOD 30 MIN: CPT | Performed by: STUDENT IN AN ORGANIZED HEALTH CARE EDUCATION/TRAINING PROGRAM

## 2024-09-30 PROCEDURE — 85004 AUTOMATED DIFF WBC COUNT: CPT | Mod: ZL | Performed by: STUDENT IN AN ORGANIZED HEALTH CARE EDUCATION/TRAINING PROGRAM

## 2024-09-30 PROCEDURE — G0463 HOSPITAL OUTPT CLINIC VISIT: HCPCS

## 2024-09-30 PROCEDURE — 87449 NOS EACH ORGANISM AG IA: CPT | Mod: ZL | Performed by: STUDENT IN AN ORGANIZED HEALTH CARE EDUCATION/TRAINING PROGRAM

## 2024-09-30 PROCEDURE — 85652 RBC SED RATE AUTOMATED: CPT | Mod: ZL | Performed by: STUDENT IN AN ORGANIZED HEALTH CARE EDUCATION/TRAINING PROGRAM

## 2024-09-30 PROCEDURE — 82164 ANGIOTENSIN I ENZYME TEST: CPT | Mod: ZL | Performed by: STUDENT IN AN ORGANIZED HEALTH CARE EDUCATION/TRAINING PROGRAM

## 2024-09-30 PROCEDURE — 82465 ASSAY BLD/SERUM CHOLESTEROL: CPT | Mod: ZL | Performed by: STUDENT IN AN ORGANIZED HEALTH CARE EDUCATION/TRAINING PROGRAM

## 2024-09-30 PROCEDURE — 36415 COLL VENOUS BLD VENIPUNCTURE: CPT | Mod: ZL | Performed by: STUDENT IN AN ORGANIZED HEALTH CARE EDUCATION/TRAINING PROGRAM

## 2024-09-30 PROCEDURE — 86780 TREPONEMA PALLIDUM: CPT | Mod: ZL | Performed by: STUDENT IN AN ORGANIZED HEALTH CARE EDUCATION/TRAINING PROGRAM

## 2024-09-30 PROCEDURE — 81374 HLA I TYPING 1 ANTIGEN LR: CPT | Mod: ZL | Performed by: STUDENT IN AN ORGANIZED HEALTH CARE EDUCATION/TRAINING PROGRAM

## 2024-09-30 PROCEDURE — 82040 ASSAY OF SERUM ALBUMIN: CPT | Mod: ZL | Performed by: STUDENT IN AN ORGANIZED HEALTH CARE EDUCATION/TRAINING PROGRAM

## 2024-09-30 PROCEDURE — 87798 DETECT AGENT NOS DNA AMP: CPT | Mod: ZL | Performed by: STUDENT IN AN ORGANIZED HEALTH CARE EDUCATION/TRAINING PROGRAM

## 2024-09-30 ASSESSMENT — PAIN SCALES - GENERAL: PAINLEVEL: NO PAIN (0)

## 2024-09-30 NOTE — PROGRESS NOTES
Assessment & Plan     (H20.9) Iritis of both eyes  (primary encounter diagnosis)  Comment: recurrent, OS>OD. Continue prednisolone eye drops. Keep followup appt with ophthamologist.  Plan: Anti Nuclear Melany IgG by IFA with Reflex,         Rheumatoid factor, Sedimentation Rate (ESR),         CRP inflammation, LYME DISEASE TOTAL ANTIBODIES        WITH REFLEX TO CONFIRMATION, Tick-Borne Disease        Panel by PCR, Blastomyces Agn Quant EIA Non         Blood, Blastomyces antibody ID, Quantiferon-TB         Gold Plus, Angiotensin converting enzyme, CBC         and Differential, Comprehensive metabolic         panel, Complement C3, Complement C4, Treponema         Ab w Reflex to RPR and Titer, HLA-B27 Typing,         CANCELED: HLA-B27 Typing    (B99.9) Recurrent infections  Comment: eyes and affecting vision  Plan: Anti Nuclear Melany IgG by IFA with Reflex,         Rheumatoid factor, Sedimentation Rate (ESR),         CRP inflammation, LYME DISEASE TOTAL ANTIBODIES        WITH REFLEX TO CONFIRMATION, Tick-Borne Disease        Panel by PCR, Blastomyces Agn Quant EIA Non         Blood, Blastomyces antibody ID, Quantiferon-TB         Gold Plus, Angiotensin converting enzyme, CBC         and Differential, Comprehensive metabolic         panel, Lipid Panel, Complement C3, Complement         C4, Treponema Ab w Reflex to RPR and Titer,         HLA-B27 Typing, CANCELED: HLA-B27 Typing    (D84.9) Immunodeficiency (H)  Comment: suspect dz process causing immunodeficinecy  Plan: Anti Nuclear Melany IgG by IFA with Reflex,         Rheumatoid factor, Sedimentation Rate (ESR),         CRP inflammation, LYME DISEASE TOTAL ANTIBODIES        WITH REFLEX TO CONFIRMATION, Tick-Borne Disease        Panel by PCR, Blastomyces Agn Quant EIA Non         Blood, Blastomyces antibody ID, Quantiferon-TB         Gold Plus, Angiotensin converting enzyme, CBC         and Differential, Comprehensive metabolic         panel, Complement C3, Complement C4,  Writer called Giuseppe and let him know that unfortunately his insurance company is upholding the denial.  We will have to cancel his surgery for tomorrow.  Writer will have Lauren TAYLOR reach out to him next week about scheduling a follow-up appointment to discuss next steps.  Giuseppe expressed understanding and had no other questions at this time.    Treponema         Ab w Reflex to RPR and Titer, HLA-B27 Typing,         CANCELED: HLA-B27 Typing    (F03.94) Dementia with anxiety, unspecified dementia severity, unspecified dementia type (H)  Comment: Continue donepezil and cetirizine    (I10) Primary hypertension  Comment: no longer on medication but monitors at home  Plan: Angiotensin converting enzyme    (E78.2) Mixed hyperlipidemia  Comment: no longer on medication  Plan: Lipid Panel    (I25.10) Atherosclerosis of coronary artery of native heart without angina pectoris, unspecified vessel or lesion type  Comment: no longer on medication including aspirin  Plan: Angiotensin converting enzyme, CBC and         Differential, Comprehensive metabolic panel    (Z68.21) Body mass index (BMI) 21.0-21.9, adult  Comment: maintain healthy weight         Return in about 3 months (around 12/30/2024) for Follow up.    Earl Sadler is a 79 year old, presenting for the following health issues:  Follow Up (Blood work)        9/30/2024    12:53 PM   Additional Questions   Roomed by Flavia hagan   Accompanied by Keiry spouse     History of Present Illness       Reason for visit:  Referred by eye doctor   He is taking medications regularly.     79-year-old male with early dementia, HTN, HLD, and CAD who presents for follow-up of eye issues on the recommendation of his ophthalmologist due to recurrent infections/iritis.  Ophthalmologist has recommended autoimmune disease and immunodeficiency testing which I agree with.  At least 2 eye infections since last seen despite following ophthalmologist orders and taking medicine as directed. Has had to restart steroid drops as when he completed the first round he then almost had a complete recurrence/relapse and has had to have a longer course.  Ophthalmologist is now trying a slow taper.  Patient was recently hunting and exposed to tick bites; as with every year.  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    Memory issues. Has wine with  "dinner. No hearing aids. Takes rx Zoloft, aricept, vit D3 regularly. Wife reports he stabilized on the aricept after about a month and have not seen much improvement since; Mikhail reportedly worked better but insurance stopped covering. Zoloft has helped with anxiety and other mental health; saw improvement in memory after starting. He continues to have some OCD traits (compulsive with certain things like unloading the ). He and his wife agree that he has less irritability and no longer mumbles but seems to still have some trouble with forgetfulness; progressively struggles to find words more especially when anxious. No longer drives as he feels he cannot respond quick enough anymore.   History of ulcerative colitis which has been well controlled;  status post laparoscopic low anterior resection with splenic flexure mobilization and repair of colovesical fistula in 2011.   History of MI with PCI and 3 stents back in the 1997 but has been well controlled and asymptomatic for years. No longer taking lipitor or aspirin. Remains quit from smoking.   Planning to winter here as opposed to usual RV travel.       Review of Systems  Constitutional, HEENT, cardiovascular, pulmonary, GI, , musculoskeletal, neuro, skin, endocrine and psych systems are negative, except as otherwise noted.        Objective    /62   Pulse 63   Temp 97.6  F (36.4  C) (Tympanic)   Resp 18   Ht 1.607 m (5' 3.25\")   Wt 56.6 kg (124 lb 12.8 oz)   SpO2 98%   BMI 21.93 kg/m    Body mass index is 21.93 kg/m .  Physical Exam  Vitals and nursing note reviewed.   Constitutional:       General: He is not in acute distress.     Appearance: He is normal weight.   HENT:      Head: Normocephalic.      Right Ear: Decreased hearing noted.      Left Ear: Decreased hearing noted.      Nose: No congestion or rhinorrhea.      Mouth/Throat:      Mouth: Mucous membranes are moist.      Pharynx: No posterior oropharyngeal erythema.   Eyes:      " General: No visual field deficit.     Extraocular Movements: Extraocular movements intact.      Conjunctiva/sclera:      Right eye: Right conjunctiva is not injected.      Left eye: Left conjunctiva is not injected.   Cardiovascular:      Rate and Rhythm: Normal rate and regular rhythm.      Pulses: Normal pulses.      Heart sounds: Murmur heard.      No friction rub. No gallop.   Pulmonary:      Effort: Pulmonary effort is normal.      Breath sounds: No wheezing, rhonchi or rales.   Abdominal:      General: Bowel sounds are normal.      Tenderness: There is no abdominal tenderness.   Musculoskeletal:      Right lower leg: No edema.      Left lower leg: No edema.   Skin:     General: Skin is warm and dry.      Findings: Lesion present.   Neurological:      Mental Status: He is alert.   Psychiatric:         Behavior: Behavior is cooperative.            Office Visit on 09/01/2022   Component Date Value Ref Range Status    Sodium 09/01/2022 137  134 - 144 mmol/L Final    Potassium 09/01/2022 4.0  3.5 - 5.1 mmol/L Final    Chloride 09/01/2022 103  98 - 107 mmol/L Final    Carbon Dioxide (CO2) 09/01/2022 27  21 - 31 mmol/L Final    Anion Gap 09/01/2022 7  3 - 14 mmol/L Final    Urea Nitrogen 09/01/2022 19  7 - 25 mg/dL Final    Creatinine 09/01/2022 0.99  0.70 - 1.30 mg/dL Final    Calcium 09/01/2022 9.8  8.6 - 10.3 mg/dL Final    Glucose 09/01/2022 91  70 - 105 mg/dL Final    Alkaline Phosphatase 09/01/2022 57  34 - 104 U/L Final    AST 09/01/2022 15  13 - 39 U/L Final    ALT 09/01/2022 13  7 - 52 U/L Final    Protein Total 09/01/2022 7.5  6.4 - 8.9 g/dL Final    Albumin 09/01/2022 4.3  3.5 - 5.7 g/dL Final    Bilirubin Total 09/01/2022 0.5  0.3 - 1.0 mg/dL Final    GFR Estimate 09/01/2022 79  >60 mL/min/1.73m2 Final    Effective December 21, 2021 eGFRcr in adults is calculated using the 2021 CKD-EPI creatinine equation which includes age and gender (Leslie et al., NEJM, DOI: 10.1056/SJXCpw2689131)    Cholesterol  09/01/2022 278 (H)  <200 mg/dL Final    Triglycerides 09/01/2022 247 (H)  <150 mg/dL Final    Direct Measure HDL 09/01/2022 59  23 - 92 mg/dL Final    LDL Cholesterol Calculated 09/01/2022 170 (H)  <=100 mg/dL Final    Non HDL Cholesterol 09/01/2022 219 (H)  <130 mg/dL Final    Patient Fasting > 8hrs? 09/01/2022 No   Final    Vitamin B12 09/01/2022 802  180 - 914 pg/mL Final    Folic Acid 09/01/2022 11.2  >=5.2 ng/mL Final    TSH 09/01/2022 1.93  0.40 - 4.00 mU/L Final    PSA Tumor Marker 09/01/2022 2.36  0.00 - 4.00 ug/L Final    WBC Count 09/01/2022 8.4  4.0 - 11.0 10e3/uL Final    RBC Count 09/01/2022 5.42  4.40 - 5.90 10e6/uL Final    Hemoglobin 09/01/2022 16.4  13.3 - 17.7 g/dL Final    Hematocrit 09/01/2022 48.3  40.0 - 53.0 % Final    MCV 09/01/2022 89  78 - 100 fL Final    MCH 09/01/2022 30.3  26.5 - 33.0 pg Final    MCHC 09/01/2022 34.0  31.5 - 36.5 g/dL Final    RDW 09/01/2022 13.7  10.0 - 15.0 % Final    Platelet Count 09/01/2022 301  150 - 450 10e3/uL Final    % Neutrophils 09/01/2022 52  % Final    % Lymphocytes 09/01/2022 33  % Final    % Monocytes 09/01/2022 8  % Final    % Eosinophils 09/01/2022 5  % Final    % Basophils 09/01/2022 1  % Final    % Immature Granulocytes 09/01/2022 1  % Final    NRBCs per 100 WBC 09/01/2022 0  <1 /100 Final    Absolute Neutrophils 09/01/2022 4.5  1.6 - 8.3 10e3/uL Final    Absolute Lymphocytes 09/01/2022 2.8  0.8 - 5.3 10e3/uL Final    Absolute Monocytes 09/01/2022 0.6  0.0 - 1.3 10e3/uL Final    Absolute Eosinophils 09/01/2022 0.4  0.0 - 0.7 10e3/uL Final    Absolute Basophils 09/01/2022 0.0  0.0 - 0.2 10e3/uL Final    Absolute Immature Granulocytes 09/01/2022 0.1  <=0.4 10e3/uL Final    Absolute NRBCs 09/01/2022 0.0  10e3/uL Final     Results for orders placed or performed in visit on 09/30/24   Anti Nuclear Melany IgG by IFA with Reflex     Status: Normal   Result Value Ref Range    GRETA interpretation Negative Negative   Rheumatoid factor     Status: Normal   Result  Value Ref Range    Rheumatoid Factor <10 <14 IU/mL   Sedimentation Rate (ESR)     Status: Normal   Result Value Ref Range    Erythrocyte Sedimentation Rate 3 0 - 20 mm/hr   CRP inflammation     Status: Normal   Result Value Ref Range    CRP Inflammation 3.21 <5.00 mg/L   LYME DISEASE TOTAL ANTIBODIES WITH REFLEX TO CONFIRMATION     Status: Normal   Result Value Ref Range    Lyme Disease Antibodies Total 0.05 <0.90   Comprehensive metabolic panel     Status: Abnormal   Result Value Ref Range    Sodium 142 135 - 145 mmol/L    Potassium 4.5 3.4 - 5.3 mmol/L    Carbon Dioxide (CO2) 29 22 - 29 mmol/L    Anion Gap 9 7 - 15 mmol/L    Urea Nitrogen 26.0 (H) 8.0 - 23.0 mg/dL    Creatinine 1.02 0.67 - 1.17 mg/dL    GFR Estimate 75 >60 mL/min/1.73m2    Calcium 9.8 8.8 - 10.4 mg/dL    Chloride 104 98 - 107 mmol/L    Glucose 77 70 - 99 mg/dL    Alkaline Phosphatase 79 40 - 150 U/L    AST 23 0 - 45 U/L    ALT 18 0 - 70 U/L    Protein Total 7.4 6.4 - 8.3 g/dL    Albumin 4.3 3.5 - 5.2 g/dL    Bilirubin Total 0.2 <=1.2 mg/dL    Patient Fasting > 8hrs? No    Lipid Panel     Status: Abnormal   Result Value Ref Range    Cholesterol 259 (H) <200 mg/dL    Triglycerides 303 (H) <150 mg/dL    Direct Measure HDL 57 >=40 mg/dL    LDL Cholesterol Calculated 141 (H) <100 mg/dL    Non HDL Cholesterol 202 (H) <130 mg/dL    Patient Fasting > 8hrs? No     Narrative    Cholesterol  Desirable: < 200 mg/dL  Borderline High: 200 - 239 mg/dL  High: >= 240 mg/dL    Triglycerides  Normal: < 150 mg/dL  Borderline High: 150 - 199 mg/dL  High: 200-499 mg/dL  Very High: >= 500 mg/dL    Direct Measure HDL  Female: >= 50 mg/dL   Male: >= 40 mg/dL    LDL Cholesterol  Desirable: < 100 mg/dL  Above Desirable: 100 - 129 mg/dL   Borderline High: 130 - 159 mg/dL   High:  160 - 189 mg/dL   Very High: >= 190 mg/dL    Non HDL Cholesterol  Desirable: < 130 mg/dL  Above Desirable: 130 - 159 mg/dL  Borderline High: 160 - 189 mg/dL  High: 190 - 219 mg/dL  Very High: >= 220  mg/dL   Complement C3     Status: Normal   Result Value Ref Range    C3 Complement 102 81 - 157 mg/dL   Complement C4     Status: Normal   Result Value Ref Range    C4 Complement 35 13 - 39 mg/dL   Treponema Ab w Reflex to RPR and Titer     Status: Normal   Result Value Ref Range    Treponema Antibody Total Nonreactive Nonreactive   CBC with platelets and differential     Status: None   Result Value Ref Range    WBC Count 10.0 4.0 - 11.0 10e3/uL    RBC Count 5.29 4.40 - 5.90 10e6/uL    Hemoglobin 16.0 13.3 - 17.7 g/dL    Hematocrit 49.7 40.0 - 53.0 %    MCV 94 78 - 100 fL    MCH 30.2 26.5 - 33.0 pg    MCHC 32.2 31.5 - 36.5 g/dL    RDW 13.9 10.0 - 15.0 %    Platelet Count 305 150 - 450 10e3/uL    % Neutrophils 65 %    % Lymphocytes 24 %    % Monocytes 6 %    % Eosinophils 4 %    % Basophils 1 %    % Immature Granulocytes 0 %    NRBCs per 100 WBC 0 <1 /100    Absolute Neutrophils 6.4 1.6 - 8.3 10e3/uL    Absolute Lymphocytes 2.4 0.8 - 5.3 10e3/uL    Absolute Monocytes 0.6 0.0 - 1.3 10e3/uL    Absolute Eosinophils 0.4 0.0 - 0.7 10e3/uL    Absolute Basophils 0.1 0.0 - 0.2 10e3/uL    Absolute Immature Granulocytes 0.0 <=0.4 10e3/uL    Absolute NRBCs 0.0 10e3/uL   Quantiferon TB Gold Plus Grey Tube     Status: None    Specimen: Peripheral Blood   Result Value Ref Range    Quantiferon Nil Tube 0.05 IU/mL   Quantiferon TB Gold Plus Green Tube     Status: None    Specimen: Peripheral Blood   Result Value Ref Range    Quantiferon TB1 Tube 0.04 IU/mL   Quantiferon TB Gold Plus Yellow Tube     Status: None    Specimen: Peripheral Blood   Result Value Ref Range    Quantiferon TB2 Tube 0.03    Quantiferon TB Gold Plus Purple Tube     Status: None    Specimen: Peripheral Blood   Result Value Ref Range    Quantiferon Mitogen 10.00 IU/mL   Quantiferon TB Gold Plus     Status: None    Specimen: Peripheral Blood   Result Value Ref Range    Quantiferon-TB Gold Plus Negative Negative    TB1 Ag minus Nil Value -0.01 IU/mL    TB2 Ag minus  Nil Value -0.02 IU/mL    Mitogen minus Nil Result 9.95 IU/mL    Nil Result 0.05 IU/mL   Quantiferon-TB Gold Plus     Status: None    Specimen: Peripheral Blood    Narrative    The following orders were created for panel order Quantiferon-TB Gold Plus.  Procedure                               Abnormality         Status                     ---------                               -----------         ------                     Quantiferon TB Gold Plus[[id number]]                         Final result               Quantiferon TB Gold Plus...[[id number]]                      Final result               Quantiferon TB Gold Plus...[[id number]]                      Final result               Quantiferon TB Gold Plus...[[id number]]                      Final result               Quantiferon TB Gold Plus...[[id number]]                      Final result                 Please view results for these tests on the individual orders.   CBC and Differential     Status: None    Narrative    The following orders were created for panel order CBC and Differential.  Procedure                               Abnormality         Status                     ---------                               -----------         ------                     CBC with platelets and d...[[id number]]                      Final result                 Please view results for these tests on the individual orders.           Moderate medical decision making with acute/recurrent and chronic conditions, review of internal and external medical records, consulting with ophthalmology, ordering tests, and continuing medications.  I spent a total of 36 minutes on the day of the visit.  I spent 24 minutes face-to-face with patient.  Total time spent by me doing chart review, history and exam, documentation and further activities per the note on day of encounter.    The longitudinal plan of care for the diagnosis(es)/condition(s) as documented were addressed during this visit. Due  to the added complexity in care, I will continue to support Doroteo in the subsequent management and with ongoing continuity of care.    Signed Electronically by: Bryan Jaeger DO

## 2024-09-30 NOTE — NURSING NOTE
Patient is here to follow up on recent blood work and issues with eyes.  Eye doctor wanted him to be checked for inflammation has had multiple episodes with his eyes.       Medication Reconciliation: complete    Flavia Rowley LPN 9/30/2024 12:58 PM       Advance care directive on file? yes  Advance care directive provided to patient? na       Flavia Rowley LPN

## 2024-10-01 LAB
B BURGDOR IGG+IGM SER QL: 0.05
RHEUMATOID FACT SERPL-ACNC: <10 IU/ML
T PALLIDUM AB SER QL: NONREACTIVE

## 2024-10-02 LAB
A PHAGOCYTOPH DNA BLD QL NAA+PROBE: NOT DETECTED
ANA SER QL IF: NEGATIVE
BABESIA DNA BLD QL NAA+PROBE: NOT DETECTED
C3 SERPL-MCNC: 102 MG/DL (ref 81–157)
C4 SERPL-MCNC: 35 MG/DL (ref 13–39)
EHRLICHIA DNA SPEC QL NAA+PROBE: NOT DETECTED
GAMMA INTERFERON BACKGROUND BLD IA-ACNC: 0.05 IU/ML
M TB IFN-G BLD-IMP: NEGATIVE
M TB IFN-G CD4+ BCKGRND COR BLD-ACNC: 9.95 IU/ML
MITOGEN IGNF BCKGRD COR BLD-ACNC: -0.01 IU/ML
MITOGEN IGNF BCKGRD COR BLD-ACNC: -0.02 IU/ML
QUANTIFERON MITOGEN: 10 IU/ML
QUANTIFERON NIL TUBE: 0.05 IU/ML
QUANTIFERON TB1 TUBE: 0.04 IU/ML
QUANTIFERON TB2 TUBE: 0.03

## 2024-10-03 LAB — ACE SERPL-CCNC: 13 U/L

## 2024-10-04 LAB
B LOCUS: NORMAL
B27TEST METHOD: NORMAL
SCANNED LAB RESULT: NORMAL

## 2024-10-05 LAB — B DERMAT AB SER QL ID: NOT DETECTED

## 2024-11-06 ENCOUNTER — MYC MEDICAL ADVICE (OUTPATIENT)
Dept: FAMILY MEDICINE | Facility: OTHER | Age: 79
End: 2024-11-06
Payer: COMMERCIAL

## 2024-11-06 NOTE — TELEPHONE ENCOUNTER
Dr. Jaeger,    You saw him on 9/30.  Is he supposed to be following up with you in 3 months?      _________________________________________________________________________    7/23/24  Neurology referral placed for dementia with anxiety.         9/30/24  LOV with Mil  (I don't see follow-up time noted)    Althea Garcia, RN on 11/6/2024 at 4:16 PM

## 2024-11-08 NOTE — TELEPHONE ENCOUNTER
He can keep his appointment in December with me if he likes but may not be as beneficial since he has not seen neurology.  Okay to wait until July for annual if he desires.  I do recommend that he keeps his appointment/follow-up with ophthalmology since his labs checking for immunodeficiency were negative.

## 2024-11-14 ENCOUNTER — MYC MEDICAL ADVICE (OUTPATIENT)
Dept: FAMILY MEDICINE | Facility: OTHER | Age: 79
End: 2024-11-14
Payer: COMMERCIAL

## 2024-11-15 NOTE — TELEPHONE ENCOUNTER
[address],    Can you please help patient schedule with Meri Burger when he comes to [facility]?      7/23/24  Neurology Referral.     Patient is wanting to schedule with Meri Burger when he comes to [facility]. (They declined [facility]).     Althea Garcia RN on 11/15/2024 at 10:45 AM

## 2024-11-15 NOTE — TELEPHONE ENCOUNTER
See other MyChart encounter from 11/6.    Responded to in that encounter.     Althea Garcia RN on 11/15/2024 at 10:48 AM

## 2025-05-08 ENCOUNTER — OFFICE VISIT (OUTPATIENT)
Dept: NEUROLOGY | Facility: OTHER | Age: 80
End: 2025-05-08
Attending: PSYCHIATRY & NEUROLOGY
Payer: COMMERCIAL

## 2025-05-08 VITALS
SYSTOLIC BLOOD PRESSURE: 120 MMHG | HEART RATE: 70 BPM | TEMPERATURE: 98.2 F | OXYGEN SATURATION: 95 % | BODY MASS INDEX: 23.25 KG/M2 | HEIGHT: 63 IN | WEIGHT: 131.2 LBS | RESPIRATION RATE: 20 BRPM | DIASTOLIC BLOOD PRESSURE: 69 MMHG

## 2025-05-08 DIAGNOSIS — F03.90 MAJOR NEUROCOGNITIVE DISORDER (H): Primary | ICD-10-CM

## 2025-05-08 DIAGNOSIS — F03.94 DEMENTIA WITH ANXIETY, UNSPECIFIED DEMENTIA SEVERITY, UNSPECIFIED DEMENTIA TYPE (H): ICD-10-CM

## 2025-05-08 PROCEDURE — G0463 HOSPITAL OUTPT CLINIC VISIT: HCPCS

## 2025-05-08 ASSESSMENT — PAIN SCALES - GENERAL: PAINLEVEL_OUTOF10: NO PAIN (0)

## 2025-05-08 NOTE — PATIENT INSTRUCTIONS
Reason for today's visit: Memory loss    Your diagnosis: Possible Alzheimers disease    Tests that you will need: none today, consider P-tau 217 if you are interested in future    Treatment plan:   - Continue donepezil.    - Find a new primary care doctor      Your test results are reviewed several times a day.  Once they are all in, you will be sent a letter with your results and/or if you are signed up for on-line services, you will be e-mailed the results.  If there are serious findings, you typically will be called.    If you have any questions about your visit, your symptoms, your medication, your test results or it is not clear what your diagnosis or treatment plan is please contact our office at [phone] for general neurology    If you need follow-up in the future, please call [phone] for an appointment.      Meri Burger DO  Neurology

## 2025-05-08 NOTE — PROGRESS NOTES
Neurology Clinic - New Consultation  5/8/2025    Reason for Consultation: cognitive concerns    Requesting Provider: Bryan Jaeger    Assessment:  #Possible Azheimers dementia  #Major neurocognitive disorder    Mr. Martinez 80-year-old male who presents for consult for memory loss.  Memory loss is primarily with short-term memory and has been slowly progressive for the past 9 years.  This in combination of his memory prominent mesial temporal lobe atrophy are most suggestive of Alzheimer's.  I spent much time discussing with him the course and natural prognosis of Alzheimer's disease.  Discussed role of genetic testing, and primary care testing.  At this time they do not want to pursue any more testing.  It is unlikely testing would .  If they are interested in the future would strongly consider P-tau testing as below.  I discussed the how independent living will likely become problematic in the future and is valuable to begin to looking at care options as things progress.  I also offered long-term follow-up but at this time they state they want to follow-up only on an as-needed basis.  They do not have a primary care doctor and I advised that they do need to have a primary care doctor as this progresses.    Plan:  - Continue donepezil 10mg  - Find a new primary care doctor  - consider P-tau 217 testing in future if they were to become interested.      Follow up in Neurology clinic in as needed should new concerns arise.    Meri Burger DO   of Neurology      52 min spent on the date of the encounter in chart review, patient visit, review of tests, documentation and/or discussion with other providers about the issues documented above.         History of Presenting Symptoms:  Doroteo Martinez is a 80 year old male who presents today for evaluation of memory concerns.   He states he has difficult with cognition.  He states he might have trouble understanding at time when he  thinks it should be fine.  Wife Taisha states there is memory issues.   He states his long term memory is relatively intact but short term is having more difficulty.    Wife Taisha states that things have been going on for at least 9 years.  At that time he was having trouble getting into his accounts which normally would never have been a problem.   He will sometimes get a little lost.  Wife states he will forget to shut doors but denies any major safety issues.  He has lost the ability to fix things as well as he used too.   Just them in the home and they are looking into senior housing.  He is no longer driving because processing speed is very slow.   He has not driven in a year.   Not doing any of the more complex things like managing finances.      He has a paper assessment and MRI and was told it was dementia.   No specific diagnosis ever given.      He is donepezil for about 2 years, no side effects.  Unclear if this has heped with the memory but state he is sleeping better.      Mom had dementia, no other family history known but with several family members living into 80s and 90s.      He is eating healthy and works around the house but no formal exercise.      ROS: Complete 10-point review of systems was negative except as noted in the HPI.    Past Medical History:  Patient Active Problem List   Diagnosis    Abdominal wall hernia    ACP (advance care planning)    Arthritis of right wrist    Colitis, ulcerative (H)    Coronary atherosclerosis    Diverticular disease of colon    Fistula, intestinovesical    Hyperlipidemia    Hypertension    ST elevation (STEMI) myocardial infarction (H)       Past Surgical History:  Past Surgical History:   Procedure Laterality Date    COLON SURGERY      4/2011,colon resection with removal of 18 inches of colon and repair of colovesicular fistula Dr. Dawson    COLONOSCOPY  06/01/2012 6/12,with biopsy, Dr. Barr, [facility]    COLONOSCOPY  04/01/2011    HEART CATH,  ANGIOPLASTY      ,MI with stenting    left cataract extraction with IOL  2019    Dr. Maldonado    OTHER SURGICAL HISTORY      ,[id number],SCAN-ANGIOGRAM,coronary angiography with stenting [facility]    right cataract extraction with IOL         PCP: No Ref-Primary, Physician    Allergies:   Allergies   Allergen Reactions    Aspirin Other (See Comments)     colitis       Medications:  Current Outpatient Medications   Medication Sig Dispense Refill    Blood Pressure Monitor MISC As directed.      Cholecalciferol (VITAMIN D3) 50 MCG ( UT) CAPS       donepezil (ARICEPT) 10 MG tablet TAKE 1 TABLET(10 MG) BY MOUTH AT BEDTIME 90 tablet 2    Multiple Vitamin (MULTIVITAMIN ADULT PO) Take by mouth.      prednisoLONE acetate (PRED FORTE) 1 % ophthalmic suspension Place 1 drop Into the left eye every hour      sertraline (ZOLOFT) 100 MG tablet TAKE 1 TABLET(100 MG) BY MOUTH DAILY 90 tablet 2    ASPIRIN NOT PRESCRIBED (INTENTIONAL) Please choose reason not prescribed, below (Patient not taking: Reported on 2025)         Family History:  Family History   Problem Relation Age of Onset    Other - See Comments Mother         Dementia    Other - See Comments Father         COPD       Social History:  Social History     Socioeconomic History    Marital status:      Spouse name: Taisha    Number of children: Not on file    Years of education: Not on file    Highest education level: Not on file   Occupational History    Occupation: mental health admin (retired)   Tobacco Use    Smoking status: Former     Current packs/day: 0.00     Types: Cigarettes     Quit date: 1976     Years since quittin.7     Passive exposure: Past    Smokeless tobacco: Never   Vaping Use    Vaping status: Never Used   Substance and Sexual Activity    Alcohol use: Yes     Alcohol/week: 0.0 standard drinks of alcohol     Comment: Alcoholic Drinks/day: daily wine with dinner 3-4 at times    Drug use: No     Comment: Drug use: No     Sexual activity: Not Currently     Partners: Female   Other Topics Concern    Parent/sibling w/ CABG, MI or angioplasty before 65F 55M? 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 Go to warmer areas for winter.     Social Drivers of Health     Financial Resource Strain: Low Risk  (6/29/2024)    Financial Resource Strain     Within the past 12 months, have you or your family members you live with been unable to get utilities (heat, electricity) when it was really needed?: No   Food Insecurity: Low Risk  (5/8/2025)    Food Insecurity     Within the past 12 months, did you worry that your food would run out before you got money to buy more?: No     Within the past 12 months, did the food you bought just not last and you didn t have money to get more?: No   Transportation Needs: Low Risk  (6/29/2024)    Transportation Needs     Within the past 12 months, has lack of transportation kept you from medical appointments, getting your medicines, non-medical meetings or appointments, work, or from getting things that you need?: No   Physical Activity: Not on file   Stress: Stress Concern Present (6/29/2024)    [employer] - Occupational Stress Questionnaire     Feeling of Stress : To some extent   Social Connections: Unknown (6/29/2024)    Social Connection and Isolation Panel [NHANES]     Frequency of Communication with Friends and Family: Not on file     Frequency of Social Gatherings with Friends and Family: Once a week     Attends Sabianist Services: Not on file     Active Member of Clubs or Organizations: Not on file     Attends Club or Organization Meetings: Not on file     Marital Status: Not on file   Interpersonal Safety: Not on file   Housing Stability: Low Risk  (6/29/2024)    Housing Stability     Do you have housing? : Yes     Are you worried about losing  "your housing?: No     Alcohol:  couple glasses red wine  Tobacco: none  Illicit drugs: no drug use  Caffeine: 1 cup coffee.      Physical Exam:  Vitals: /69 (BP Location: Right arm, Patient Position: Sitting, Cuff Size: Adult Regular)   Pulse 70   Temp 98.2  F (36.8  C) (Tympanic)   Resp 20   Ht 1.6 m (5' 3\")   Wt 59.5 kg (131 lb 3.2 oz)   SpO2 95%   BMI 23.24 kg/m     General: Seated comfortably in no acute distress.  HEENT: Neck supple with normal range of motion. No paracervical muscle tenderness or tightness. Heart: Regular rate  Lungs: breathing comfortably  GI: Non tender  Extremities: no edema  Skin: No rashes  Neurologic:     Mental Status: 15 out of 30 at cognitive testing.  Missing most points for delayed recall, language, and visual-spatial testing.  0 out of 5 on delayed recall.     Cranial Nerves:  PERRL. EOMI with no nystagmus. Facial sensation intact/symmetric. Facial movements symmetric. Hearing not formally tested but intact to conversation. Palate elevation symmetric, uvula midline. No dysarthria. Shoulder shrug strong bilaterally. Tongue protrusion midline.     Motor: No tremors or other abnormal movements observed. Muscle tone normal throughout. No pronator drift.  Strength 5/5 throughout upper and lower extremities.     Deep Tendon Reflexes: 2+/symmetric throughout upper and lower extremities.      Sensory: Intact/symmetric to light touch     Coordination: Finger-nose-finger and heel-shin intact without dysmetria.     Gait: Mildly wide-based with steady casual gait.  Pertinent Investigations:  MRI brain personally reviewed: Substantially generalized atrophy but most noticeable in the parietal lobes and mesial temporal structures.    B12: 802  TSH 1.9        Dragon disclaimer: This documentation was completed with the aid of dictation software.  Please note that there may be some inconsistencies due to software errors.  Errors are corrected in real time, however if there is a " remaining error, please do not hesitate to reach out for clarification.

## 2025-05-08 NOTE — NURSING NOTE
"Chief Complaint   Patient presents with    Dementia   Patient presents to the clinic today to talk about dementia.    Initial /69 (BP Location: Right arm, Patient Position: Sitting, Cuff Size: Adult Regular)   Pulse 70   Temp 98.2  F (36.8  C) (Tympanic)   Resp 20   Ht 1.6 m (5' 3\")   Wt 59.5 kg (131 lb 3.2 oz)   SpO2 95%   BMI 23.24 kg/m   Estimated body mass index is 23.24 kg/m  as calculated from the following:    Height as of this encounter: 1.6 m (5' 3\").    Weight as of this encounter: 59.5 kg (131 lb 3.2 oz).  Medication Review: complete    The next two questions are to help us understand your food security.  If you are feeling you need any assistance in this area, we have resources available to support you today.          6/29/2024   SDOH- Food Insecurity   Within the past 12 months, did you worry that your food would run out before you got money to buy more? N   Within the past 12 months, did the food you bought just not last and you didn t have money to get more? N        Data saved with a previous flowsheet row definition         Health Care Directive:  Patient has a Health Care Directive on file      Robinson Tang      "

## 2025-05-08 NOTE — LETTER
5/8/2025      Doroteo Martinez  [address]  [place] [address]      Dear Colleague,    Thank you for referring your patient, Doroteo Martinez, to the [facility]. Please see a copy of my visit note below.    Neurology Clinic - New Consultation  5/8/2025    Reason for Consultation: cognitive concerns    Requesting Provider: Bryan Jaeger    Assessment:  #Possible Azheimers dementia  #Major neurocognitive disorder    Mr. Martinez 80-year-old male who presents for consult for memory loss.  Memory loss is primarily with short-term memory and has been slowly progressive for the past 9 years.  This in combination of his memory prominent mesial temporal lobe atrophy are most suggestive of Alzheimer's.  I spent much time discussing with him the course and natural prognosis of Alzheimer's disease.  Discussed role of genetic testing, and primary care testing.  At this time they do not want to pursue any more testing.  It is unlikely testing would .  If they are interested in the future would strongly consider P-tau testing as below.  I discussed the how independent living will likely become problematic in the future and is valuable to begin to looking at care options as things progress.  I also offered long-term follow-up but at this time they state they want to follow-up only on an as-needed basis.  They do not have a primary care doctor and I advised that they do need to have a primary care doctor as this progresses.    Plan:  - Continue donepezil 10mg  - Find a new primary care doctor  - consider P-tau 217 testing in future if they were to become interested.      Follow up in Neurology clinic in as needed should new concerns arise.    Meri Burger DO   of Neurology      52 min spent on the date of the encounter in chart review, patient visit, review of tests, documentation and/or discussion with other providers about the issues documented above.          History of Presenting Symptoms:  Doroteo Martinez is a 80 year old male who presents today for evaluation of memory concerns.   He states he has difficult with cognition.  He states he might have trouble understanding at time when he thinks it should be fine.  Wife Taisha states there is memory issues.   He states his long term memory is relatively intact but short term is having more difficulty.    Wife Taisha states that things have been going on for at least 9 years.  At that time he was having trouble getting into his accounts which normally would never have been a problem.   He will sometimes get a little lost.  Wife states he will forget to shut doors but denies any major safety issues.  He has lost the ability to fix things as well as he used too.   Just them in the home and they are looking into senior housing.  He is no longer driving because processing speed is very slow.   He has not driven in a year.   Not doing any of the more complex things like managing finances.      He has a paper assessment and MRI and was told it was dementia.   No specific diagnosis ever given.      He is donepezil for about 2 years, no side effects.  Unclear if this has heped with the memory but state he is sleeping better.      Mom had dementia, no other family history known but with several family members living into 80s and 90s.      He is eating healthy and works around the house but no formal exercise.      ROS: Complete 10-point review of systems was negative except as noted in the HPI.    Past Medical History:  Patient Active Problem List   Diagnosis     Abdominal wall hernia     ACP (advance care planning)     Arthritis of right wrist     Colitis, ulcerative (H)     Coronary atherosclerosis     Diverticular disease of colon     Fistula, intestinovesical     Hyperlipidemia     Hypertension     ST elevation (STEMI) myocardial infarction (H)       Past Surgical History:  Past Surgical History:   Procedure Laterality  Date     COLON SURGERY      2011,colon resection with removal of 18 inches of colon and repair of colovesicular fistula Dr. Dawson     COLONOSCOPY  2012,with biopsy, Dr. Barr, [facility]     COLONOSCOPY  2011     HEART CATH, ANGIOPLASTY      ,MI with stenting     left cataract extraction with IOL  2019    Dr. Maldonado     OTHER SURGICAL HISTORY      ,[id number],SCAN-ANGIOGRAM,coronary angiography with stenting [facility]     right cataract extraction with IOL         PCP: No Ref-Primary, Physician    Allergies:   Allergies   Allergen Reactions     Aspirin Other (See Comments)     colitis       Medications:  Current Outpatient Medications   Medication Sig Dispense Refill     Blood Pressure Monitor MISC As directed.       Cholecalciferol (VITAMIN D3) 50 MCG (2000) CAPS        donepezil (ARICEPT) 10 MG tablet TAKE 1 TABLET(10 MG) BY MOUTH AT BEDTIME 90 tablet 2     Multiple Vitamin (MULTIVITAMIN ADULT PO) Take by mouth.       prednisoLONE acetate (PRED FORTE) 1 % ophthalmic suspension Place 1 drop Into the left eye every hour       sertraline (ZOLOFT) 100 MG tablet TAKE 1 TABLET(100 MG) BY MOUTH DAILY 90 tablet 2     ASPIRIN NOT PRESCRIBED (INTENTIONAL) Please choose reason not prescribed, below (Patient not taking: Reported on 2025)         Family History:  Family History   Problem Relation Age of Onset     Other - See Comments Mother         Dementia     Other - See Comments Father         COPD       Social History:  Social History     Socioeconomic History     Marital status:      Spouse name: Taisha     Number of children: Not on file     Years of education: Not on file     Highest education level: Not on file   Occupational History     Occupation: mental health admin (retired)   Tobacco Use     Smoking status: Former     Current packs/day: 0.00     Types: Cigarettes     Quit date: 1976     Years since quittin.7     Passive exposure: Past     Smokeless tobacco:  Never   Vaping Use     Vaping status: Never Used   Substance and Sexual Activity     Alcohol use: Yes     Alcohol/week: 0.0 standard drinks of alcohol     Comment: Alcoholic Drinks/day: daily wine with dinner 3-4 at times     Drug use: No     Comment: Drug use: No     Sexual activity: Not Currently     Partners: Female   Other Topics Concern     Parent/sibling w/ CABG, MI or angioplasty before 65F 55M? 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 Go to warmer areas for winter.     Social Drivers of Health     Financial Resource Strain: Low Risk  (6/29/2024)    Financial Resource Strain      Within the past 12 months, have you or your family members you live with been unable to get utilities (heat, electricity) when it was really needed?: No   Food Insecurity: Low Risk  (5/8/2025)    Food Insecurity      Within the past 12 months, did you worry that your food would run out before you got money to buy more?: No      Within the past 12 months, did the food you bought just not last and you didn t have money to get more?: No   Transportation Needs: Low Risk  (6/29/2024)    Transportation Needs      Within the past 12 months, has lack of transportation kept you from medical appointments, getting your medicines, non-medical meetings or appointments, work, or from getting things that you need?: No   Physical Activity: Not on file   Stress: Stress Concern Present (6/29/2024)    [employer] [place] [employer] - Occupational Stress Questionnaire      Feeling of Stress : To some extent   Social Connections: Unknown (6/29/2024)    Social Connection and Isolation Panel [NHANES]      Frequency of Communication with Friends and Family: Not on file      Frequency of Social Gatherings with Friends and Family: Once a week      Attends Yarsanism Services: Not on file      Active Member of Clubs or  "Organizations: Not on file      Attends Club or Organization Meetings: Not on file      Marital Status: Not on file   Interpersonal Safety: Not on file   Housing Stability: Low Risk  (6/29/2024)    Housing Stability      Do you have housing? : Yes      Are you worried about losing your housing?: No     Alcohol:  couple glasses red wine  Tobacco: none  Illicit drugs: no drug use  Caffeine: 1 cup coffee.      Physical Exam:  Vitals: /69 (BP Location: Right arm, Patient Position: Sitting, Cuff Size: Adult Regular)   Pulse 70   Temp 98.2  F (36.8  C) (Tympanic)   Resp 20   Ht 1.6 m (5' 3\")   Wt 59.5 kg (131 lb 3.2 oz)   SpO2 95%   BMI 23.24 kg/m     General: Seated comfortably in no acute distress.  HEENT: Neck supple with normal range of motion. No paracervical muscle tenderness or tightness. Heart: Regular rate  Lungs: breathing comfortably  GI: Non tender  Extremities: no edema  Skin: No rashes  Neurologic:     Mental Status: 15 out of 30 at cognitive testing.  Missing most points for delayed recall, language, and visual-spatial testing.  0 out of 5 on delayed recall.     Cranial Nerves:  PERRL. EOMI with no nystagmus. Facial sensation intact/symmetric. Facial movements symmetric. Hearing not formally tested but intact to conversation. Palate elevation symmetric, uvula midline. No dysarthria. Shoulder shrug strong bilaterally. Tongue protrusion midline.     Motor: No tremors or other abnormal movements observed. Muscle tone normal throughout. No pronator drift.  Strength 5/5 throughout upper and lower extremities.     Deep Tendon Reflexes: 2+/symmetric throughout upper and lower extremities.      Sensory: Intact/symmetric to light touch     Coordination: Finger-nose-finger and heel-shin intact without dysmetria.     Gait: Mildly wide-based with steady casual gait.  Pertinent Investigations:  MRI brain personally reviewed: Substantially generalized atrophy but most noticeable in the parietal lobes and " mesial temporal structures.    B12: 802  TSH 1.9        Dragon disclaimer: This documentation was completed with the aid of dictation software.  Please note that there may be some inconsistencies due to software errors.  Errors are corrected in real time, however if there is a remaining error, please do not hesitate to reach out for clarification.      Again, thank you for allowing me to participate in the care of your patient.        Sincerely,        Meri Burger MD    Electronically signed